# Patient Record
Sex: MALE | Race: WHITE | Employment: OTHER | URBAN - METROPOLITAN AREA
[De-identification: names, ages, dates, MRNs, and addresses within clinical notes are randomized per-mention and may not be internally consistent; named-entity substitution may affect disease eponyms.]

---

## 2017-04-05 ENCOUNTER — HOSPITAL ENCOUNTER (OUTPATIENT)
Dept: CT IMAGING | Age: 62
Discharge: HOME | End: 2017-04-05
Payer: COMMERCIAL

## 2017-04-05 DIAGNOSIS — K43.2 INCISIONAL HERNIA WITHOUT OBSTRUCTION OR GANGRENE: ICD-10-CM

## 2017-04-05 LAB
BUN BLDV-MCNC: 20 MG/DL (ref 8–23)
CREAT SERPL-MCNC: 0.76 MG/DL (ref 0.7–1.2)
GFR AFRICAN AMERICAN: >60 ML/MIN
GFR NON-AFRICAN AMERICAN: >60 ML/MIN
GFR SERPL CREATININE-BSD FRML MDRD: NORMAL ML/MIN/{1.73_M2}
GFR SERPL CREATININE-BSD FRML MDRD: NORMAL ML/MIN/{1.73_M2}

## 2017-04-05 PROCEDURE — 74177 CT ABD & PELVIS W/CONTRAST: CPT

## 2017-04-05 PROCEDURE — 84520 ASSAY OF UREA NITROGEN: CPT

## 2017-04-05 PROCEDURE — 2580000003 HC RX 258: Performed by: SURGERY

## 2017-04-05 PROCEDURE — 82565 ASSAY OF CREATININE: CPT

## 2017-04-05 PROCEDURE — 36415 COLL VENOUS BLD VENIPUNCTURE: CPT

## 2017-04-05 PROCEDURE — 6360000004 HC RX CONTRAST MEDICATION: Performed by: SURGERY

## 2017-04-05 RX ORDER — 0.9 % SODIUM CHLORIDE 0.9 %
100 INTRAVENOUS SOLUTION INTRAVENOUS ONCE
Status: COMPLETED | OUTPATIENT
Start: 2017-04-05 | End: 2017-04-05

## 2017-04-05 RX ORDER — SODIUM CHLORIDE 0.9 % (FLUSH) 0.9 %
10 SYRINGE (ML) INJECTION PRN
Status: DISCONTINUED | OUTPATIENT
Start: 2017-04-05 | End: 2017-04-11 | Stop reason: HOSPADM

## 2017-04-05 RX ADMIN — IOHEXOL 50 ML: 240 INJECTION, SOLUTION INTRATHECAL; INTRAVASCULAR; INTRAVENOUS; ORAL at 10:19

## 2017-04-05 RX ADMIN — SODIUM CHLORIDE 100 ML: 9 INJECTION, SOLUTION INTRAVENOUS at 10:19

## 2017-04-05 RX ADMIN — Medication 10 ML: at 10:19

## 2017-04-05 RX ADMIN — IOVERSOL 130 ML: 741 INJECTION INTRA-ARTERIAL; INTRAVENOUS at 10:18

## 2017-04-08 ENCOUNTER — HOSPITAL ENCOUNTER (OUTPATIENT)
Dept: PREADMISSION TESTING | Age: 62
Discharge: HOME | End: 2017-04-08
Payer: COMMERCIAL

## 2017-04-08 VITALS
SYSTOLIC BLOOD PRESSURE: 135 MMHG | DIASTOLIC BLOOD PRESSURE: 85 MMHG | HEIGHT: 71 IN | HEART RATE: 88 BPM | TEMPERATURE: 98.1 F | RESPIRATION RATE: 16 BRPM | BODY MASS INDEX: 28.56 KG/M2 | WEIGHT: 204 LBS | OXYGEN SATURATION: 95 %

## 2017-04-08 LAB
ABSOLUTE EOS #: 0.4 K/UL (ref 0–0.4)
ABSOLUTE LYMPH #: 2 K/UL (ref 1–4.8)
ABSOLUTE MONO #: 0.6 K/UL (ref 0.1–1.3)
ANION GAP SERPL CALCULATED.3IONS-SCNC: 14 MMOL/L (ref 9–17)
BASOPHILS # BLD: 1 % (ref 0–2)
BASOPHILS ABSOLUTE: 0.1 K/UL (ref 0–0.2)
BUN BLDV-MCNC: 19 MG/DL (ref 8–23)
BUN/CREAT BLD: ABNORMAL (ref 9–20)
CALCIUM SERPL-MCNC: 9.6 MG/DL (ref 8.6–10.4)
CHLORIDE BLD-SCNC: 102 MMOL/L (ref 98–107)
CO2: 22 MMOL/L (ref 20–31)
CREAT SERPL-MCNC: 0.66 MG/DL (ref 0.7–1.2)
DIFFERENTIAL TYPE: ABNORMAL
EOSINOPHILS RELATIVE PERCENT: 5 % (ref 0–4)
GFR AFRICAN AMERICAN: >60 ML/MIN
GFR NON-AFRICAN AMERICAN: >60 ML/MIN
GFR SERPL CREATININE-BSD FRML MDRD: ABNORMAL ML/MIN/{1.73_M2}
GFR SERPL CREATININE-BSD FRML MDRD: ABNORMAL ML/MIN/{1.73_M2}
GLUCOSE BLD-MCNC: 95 MG/DL (ref 70–99)
HCT VFR BLD CALC: 43.4 % (ref 41–53)
HEMOGLOBIN: 14.7 G/DL (ref 13.5–17.5)
LYMPHOCYTES # BLD: 26 % (ref 24–44)
MCH RBC QN AUTO: 32.3 PG (ref 26–34)
MCHC RBC AUTO-ENTMCNC: 33.8 G/DL (ref 31–37)
MCV RBC AUTO: 95.6 FL (ref 80–100)
MONOCYTES # BLD: 8 % (ref 1–7)
PDW BLD-RTO: 12.7 % (ref 11.5–14.9)
PLATELET # BLD: 206 K/UL (ref 150–450)
PLATELET ESTIMATE: ABNORMAL
PMV BLD AUTO: 9.9 FL (ref 6–12)
POTASSIUM SERPL-SCNC: 4.2 MMOL/L (ref 3.7–5.3)
RBC # BLD: 4.54 M/UL (ref 4.5–5.9)
RBC # BLD: ABNORMAL 10*6/UL
SEG NEUTROPHILS: 60 % (ref 36–66)
SEGMENTED NEUTROPHILS ABSOLUTE COUNT: 4.7 K/UL (ref 1.3–9.1)
SODIUM BLD-SCNC: 138 MMOL/L (ref 135–144)
WBC # BLD: 7.7 K/UL (ref 3.5–11)
WBC # BLD: ABNORMAL 10*3/UL

## 2017-04-08 PROCEDURE — 93005 ELECTROCARDIOGRAM TRACING: CPT

## 2017-04-08 PROCEDURE — 80048 BASIC METABOLIC PNL TOTAL CA: CPT

## 2017-04-08 PROCEDURE — 85025 COMPLETE CBC W/AUTO DIFF WBC: CPT

## 2017-04-08 PROCEDURE — 36415 COLL VENOUS BLD VENIPUNCTURE: CPT

## 2017-04-08 RX ORDER — M-VIT,TX,IRON,MINS/CALC/FOLIC 27MG-0.4MG
1 TABLET ORAL DAILY
COMMUNITY

## 2017-04-08 RX ORDER — ASPIRIN 325 MG
325 TABLET ORAL DAILY
COMMUNITY

## 2017-04-08 RX ORDER — LISINOPRIL AND HYDROCHLOROTHIAZIDE 12.5; 1 MG/1; MG/1
1 TABLET ORAL DAILY
COMMUNITY

## 2017-04-08 RX ORDER — NAPROXEN SODIUM 220 MG
220 TABLET ORAL PRN
Status: ON HOLD | COMMUNITY
End: 2020-08-25

## 2017-04-10 ENCOUNTER — ANESTHESIA EVENT (OUTPATIENT)
Dept: OPERATING ROOM | Age: 62
End: 2017-04-10
Payer: COMMERCIAL

## 2017-04-11 ENCOUNTER — ANESTHESIA (OUTPATIENT)
Dept: OPERATING ROOM | Age: 62
End: 2017-04-11
Payer: COMMERCIAL

## 2017-04-11 ENCOUNTER — HOSPITAL ENCOUNTER (OUTPATIENT)
Age: 62
Setting detail: OUTPATIENT SURGERY
Discharge: HOME | End: 2017-04-11
Attending: SURGERY | Admitting: SURGERY
Payer: COMMERCIAL

## 2017-04-11 VITALS
SYSTOLIC BLOOD PRESSURE: 133 MMHG | BODY MASS INDEX: 28.56 KG/M2 | RESPIRATION RATE: 16 BRPM | TEMPERATURE: 98.2 F | HEIGHT: 71 IN | OXYGEN SATURATION: 93 % | WEIGHT: 204 LBS | HEART RATE: 81 BPM | DIASTOLIC BLOOD PRESSURE: 74 MMHG

## 2017-04-11 VITALS — OXYGEN SATURATION: 99 % | DIASTOLIC BLOOD PRESSURE: 51 MMHG | TEMPERATURE: 95.9 F | SYSTOLIC BLOOD PRESSURE: 87 MMHG

## 2017-04-11 PROCEDURE — 88305 TISSUE EXAM BY PATHOLOGIST: CPT

## 2017-04-11 PROCEDURE — 3700000000 HC ANESTHESIA ATTENDED CARE: Performed by: SURGERY

## 2017-04-11 PROCEDURE — 3700000001 HC ADD 15 MINUTES (ANESTHESIA): Performed by: SURGERY

## 2017-04-11 PROCEDURE — 7100000031 HC ASPR PHASE II RECOVERY - ADDTL 15 MIN: Performed by: SURGERY

## 2017-04-11 PROCEDURE — 3609010400 HC COLONOSCOPY POLYPECTOMY HOT BIOPSY: Performed by: SURGERY

## 2017-04-11 PROCEDURE — 2580000003 HC RX 258: Performed by: ANESTHESIOLOGY

## 2017-04-11 PROCEDURE — 7100000000 HC PACU RECOVERY - FIRST 15 MIN: Performed by: SURGERY

## 2017-04-11 PROCEDURE — 7100000001 HC PACU RECOVERY - ADDTL 15 MIN: Performed by: SURGERY

## 2017-04-11 PROCEDURE — 2500000003 HC RX 250 WO HCPCS: Performed by: NURSE ANESTHETIST, CERTIFIED REGISTERED

## 2017-04-11 PROCEDURE — 6360000002 HC RX W HCPCS: Performed by: NURSE ANESTHETIST, CERTIFIED REGISTERED

## 2017-04-11 PROCEDURE — 7100000030 HC ASPR PHASE II RECOVERY - FIRST 15 MIN: Performed by: SURGERY

## 2017-04-11 RX ORDER — MORPHINE SULFATE 2 MG/ML
2 INJECTION, SOLUTION INTRAMUSCULAR; INTRAVENOUS EVERY 5 MIN PRN
Status: DISCONTINUED | OUTPATIENT
Start: 2017-04-11 | End: 2017-04-11 | Stop reason: HOSPADM

## 2017-04-11 RX ORDER — ONDANSETRON 2 MG/ML
INJECTION INTRAMUSCULAR; INTRAVENOUS PRN
Status: DISCONTINUED | OUTPATIENT
Start: 2017-04-11 | End: 2017-04-11 | Stop reason: SDUPTHER

## 2017-04-11 RX ORDER — SODIUM CHLORIDE 0.9 % (FLUSH) 0.9 %
10 SYRINGE (ML) INJECTION PRN
Status: DISCONTINUED | OUTPATIENT
Start: 2017-04-11 | End: 2017-04-11 | Stop reason: HOSPADM

## 2017-04-11 RX ORDER — SODIUM CHLORIDE 0.9 % (FLUSH) 0.9 %
10 SYRINGE (ML) INJECTION EVERY 12 HOURS SCHEDULED
Status: DISCONTINUED | OUTPATIENT
Start: 2017-04-11 | End: 2017-04-11 | Stop reason: HOSPADM

## 2017-04-11 RX ORDER — PROPOFOL 10 MG/ML
INJECTION, EMULSION INTRAVENOUS PRN
Status: DISCONTINUED | OUTPATIENT
Start: 2017-04-11 | End: 2017-04-11 | Stop reason: SDUPTHER

## 2017-04-11 RX ORDER — DIPHENHYDRAMINE HYDROCHLORIDE 50 MG/ML
12.5 INJECTION INTRAMUSCULAR; INTRAVENOUS
Status: DISCONTINUED | OUTPATIENT
Start: 2017-04-11 | End: 2017-04-11 | Stop reason: HOSPADM

## 2017-04-11 RX ORDER — LIDOCAINE HYDROCHLORIDE 10 MG/ML
INJECTION, SOLUTION EPIDURAL; INFILTRATION; INTRACAUDAL; PERINEURAL PRN
Status: DISCONTINUED | OUTPATIENT
Start: 2017-04-11 | End: 2017-04-11 | Stop reason: SDUPTHER

## 2017-04-11 RX ORDER — FENTANYL CITRATE 50 UG/ML
INJECTION, SOLUTION INTRAMUSCULAR; INTRAVENOUS PRN
Status: DISCONTINUED | OUTPATIENT
Start: 2017-04-11 | End: 2017-04-11 | Stop reason: SDUPTHER

## 2017-04-11 RX ORDER — MEPERIDINE HYDROCHLORIDE 25 MG/ML
12.5 INJECTION INTRAMUSCULAR; INTRAVENOUS; SUBCUTANEOUS EVERY 5 MIN PRN
Status: DISCONTINUED | OUTPATIENT
Start: 2017-04-11 | End: 2017-04-11 | Stop reason: HOSPADM

## 2017-04-11 RX ORDER — LABETALOL HYDROCHLORIDE 5 MG/ML
5 INJECTION, SOLUTION INTRAVENOUS EVERY 10 MIN PRN
Status: DISCONTINUED | OUTPATIENT
Start: 2017-04-11 | End: 2017-04-11 | Stop reason: HOSPADM

## 2017-04-11 RX ORDER — ONDANSETRON 2 MG/ML
4 INJECTION INTRAMUSCULAR; INTRAVENOUS
Status: DISCONTINUED | OUTPATIENT
Start: 2017-04-11 | End: 2017-04-11 | Stop reason: HOSPADM

## 2017-04-11 RX ORDER — MIDAZOLAM HYDROCHLORIDE 1 MG/ML
INJECTION INTRAMUSCULAR; INTRAVENOUS PRN
Status: DISCONTINUED | OUTPATIENT
Start: 2017-04-11 | End: 2017-04-11 | Stop reason: SDUPTHER

## 2017-04-11 RX ORDER — EPHEDRINE SULFATE 50 MG/ML
INJECTION, SOLUTION INTRAVENOUS PRN
Status: DISCONTINUED | OUTPATIENT
Start: 2017-04-11 | End: 2017-04-11 | Stop reason: SDUPTHER

## 2017-04-11 RX ORDER — SODIUM CHLORIDE, SODIUM LACTATE, POTASSIUM CHLORIDE, CALCIUM CHLORIDE 600; 310; 30; 20 MG/100ML; MG/100ML; MG/100ML; MG/100ML
INJECTION, SOLUTION INTRAVENOUS CONTINUOUS
Status: DISCONTINUED | OUTPATIENT
Start: 2017-04-11 | End: 2017-04-11 | Stop reason: HOSPADM

## 2017-04-11 RX ADMIN — ONDANSETRON 4 MG: 2 INJECTION, SOLUTION INTRAMUSCULAR; INTRAVENOUS at 12:16

## 2017-04-11 RX ADMIN — MIDAZOLAM HYDROCHLORIDE 2 MG: 1 INJECTION, SOLUTION INTRAMUSCULAR; INTRAVENOUS at 11:48

## 2017-04-11 RX ADMIN — LIDOCAINE HYDROCHLORIDE 50 MG: 10 INJECTION, SOLUTION EPIDURAL; INFILTRATION; INTRACAUDAL; PERINEURAL at 11:52

## 2017-04-11 RX ADMIN — SODIUM CHLORIDE, POTASSIUM CHLORIDE, SODIUM LACTATE AND CALCIUM CHLORIDE: 600; 310; 30; 20 INJECTION, SOLUTION INTRAVENOUS at 10:51

## 2017-04-11 RX ADMIN — EPHEDRINE SULFATE 10 MG: 50 INJECTION INTRAMUSCULAR; INTRAVENOUS; SUBCUTANEOUS at 12:14

## 2017-04-11 RX ADMIN — EPHEDRINE SULFATE 10 MG: 50 INJECTION INTRAMUSCULAR; INTRAVENOUS; SUBCUTANEOUS at 12:35

## 2017-04-11 RX ADMIN — PROPOFOL 200 MG: 10 INJECTION, EMULSION INTRAVENOUS at 11:52

## 2017-04-11 RX ADMIN — FENTANYL CITRATE 100 MCG: 50 INJECTION, SOLUTION INTRAMUSCULAR; INTRAVENOUS at 12:02

## 2017-04-11 RX ADMIN — SODIUM CHLORIDE, POTASSIUM CHLORIDE, SODIUM LACTATE AND CALCIUM CHLORIDE: 600; 310; 30; 20 INJECTION, SOLUTION INTRAVENOUS at 11:48

## 2017-04-11 ASSESSMENT — ENCOUNTER SYMPTOMS
STRIDOR: 0
SHORTNESS OF BREATH: 0

## 2017-04-11 ASSESSMENT — PAIN - FUNCTIONAL ASSESSMENT: PAIN_FUNCTIONAL_ASSESSMENT: 0-10

## 2017-04-11 ASSESSMENT — PAIN SCALES - GENERAL
PAINLEVEL_OUTOF10: 0

## 2017-04-12 LAB
EKG ATRIAL RATE: 80 BPM
EKG P AXIS: 67 DEGREES
EKG P-R INTERVAL: 172 MS
EKG Q-T INTERVAL: 382 MS
EKG QRS DURATION: 80 MS
EKG QTC CALCULATION (BAZETT): 438 MS
EKG R AXIS: 55 DEGREES
EKG T AXIS: 50 DEGREES
EKG VENTRICULAR RATE: 79 BPM
SURGICAL PATHOLOGY REPORT: NORMAL

## 2017-04-25 ENCOUNTER — HOSPITAL ENCOUNTER (OUTPATIENT)
Dept: NUCLEAR MEDICINE | Age: 62
Discharge: HOME | End: 2017-04-25
Payer: COMMERCIAL

## 2017-04-25 ENCOUNTER — HOSPITAL ENCOUNTER (OUTPATIENT)
Dept: NON INVASIVE DIAGNOSTICS | Age: 62
Discharge: HOME | End: 2017-04-25
Payer: COMMERCIAL

## 2017-04-25 DIAGNOSIS — Z01.818 ENCOUNTER FOR PREOPERATIVE ANESTHESIOLOGY ASSESSMENT FOR CARDIAC SURGERY: ICD-10-CM

## 2017-04-25 LAB
LV EF: 58 %
LVEF MODALITY: NORMAL

## 2017-04-25 PROCEDURE — 2580000003 HC RX 258: Performed by: INTERNAL MEDICINE

## 2017-04-25 PROCEDURE — A9500 TC99M SESTAMIBI: HCPCS | Performed by: INTERNAL MEDICINE

## 2017-04-25 PROCEDURE — 78452 HT MUSCLE IMAGE SPECT MULT: CPT

## 2017-04-25 PROCEDURE — 3430000000 HC RX DIAGNOSTIC RADIOPHARMACEUTICAL: Performed by: INTERNAL MEDICINE

## 2017-04-25 PROCEDURE — 93017 CV STRESS TEST TRACING ONLY: CPT

## 2017-04-25 RX ORDER — SODIUM CHLORIDE 0.9 % (FLUSH) 0.9 %
10 SYRINGE (ML) INJECTION PRN
Status: ACTIVE | OUTPATIENT
Start: 2017-04-25 | End: 2017-04-26

## 2017-04-25 RX ORDER — METOPROLOL TARTRATE 5 MG/5ML
2.5 INJECTION INTRAVENOUS PRN
Status: ACTIVE | OUTPATIENT
Start: 2017-04-25 | End: 2017-04-26

## 2017-04-25 RX ORDER — METOPROLOL TARTRATE 5 MG/5ML
2.5 INJECTION INTRAVENOUS PRN
Status: CANCELLED | OUTPATIENT
Start: 2017-04-25 | End: 2017-04-26

## 2017-04-25 RX ORDER — AMINOPHYLLINE DIHYDRATE 25 MG/ML
100 INJECTION, SOLUTION INTRAVENOUS
Status: CANCELLED | OUTPATIENT
Start: 2017-04-25 | End: 2017-04-25

## 2017-04-25 RX ORDER — 0.9 % SODIUM CHLORIDE 0.9 %
250 INTRAVENOUS SOLUTION INTRAVENOUS ONCE
Status: DISCONTINUED | OUTPATIENT
Start: 2017-04-25 | End: 2017-04-28 | Stop reason: HOSPADM

## 2017-04-25 RX ORDER — NITROGLYCERIN 0.4 MG/1
0.4 TABLET SUBLINGUAL EVERY 5 MIN PRN
Status: CANCELLED | OUTPATIENT
Start: 2017-04-25 | End: 2017-04-26

## 2017-04-25 RX ORDER — 0.9 % SODIUM CHLORIDE 0.9 %
250 INTRAVENOUS SOLUTION INTRAVENOUS ONCE
Status: CANCELLED | OUTPATIENT
Start: 2017-04-25 | End: 2017-04-25

## 2017-04-25 RX ORDER — NITROGLYCERIN 0.4 MG/1
0.4 TABLET SUBLINGUAL EVERY 5 MIN PRN
Status: ACTIVE | OUTPATIENT
Start: 2017-04-25 | End: 2017-04-26

## 2017-04-25 RX ADMIN — TETRAKIS(2-METHOXYISOBUTYLISOCYANIDE)COPPER(I) TETRAFLUOROBORATE 11.12 MILLICURIE: 1 INJECTION, POWDER, LYOPHILIZED, FOR SOLUTION INTRAVENOUS at 07:26

## 2017-04-25 RX ADMIN — TETRAKIS(2-METHOXYISOBUTYLISOCYANIDE)COPPER(I) TETRAFLUOROBORATE 36.2 MILLICURIE: 1 INJECTION, POWDER, LYOPHILIZED, FOR SOLUTION INTRAVENOUS at 08:50

## 2017-04-25 RX ADMIN — Medication 10 ML: at 08:27

## 2017-05-15 ENCOUNTER — HOSPITAL ENCOUNTER (OUTPATIENT)
Dept: PREADMISSION TESTING | Age: 62
Discharge: HOME | End: 2017-05-15
Payer: COMMERCIAL

## 2017-05-15 VITALS
DIASTOLIC BLOOD PRESSURE: 89 MMHG | HEIGHT: 72 IN | TEMPERATURE: 97.5 F | OXYGEN SATURATION: 98 % | HEART RATE: 90 BPM | WEIGHT: 197 LBS | BODY MASS INDEX: 26.68 KG/M2 | RESPIRATION RATE: 16 BRPM | SYSTOLIC BLOOD PRESSURE: 131 MMHG

## 2017-05-15 LAB
ABSOLUTE EOS #: 0.4 K/UL (ref 0–0.4)
ABSOLUTE LYMPH #: 1.6 K/UL (ref 1–4.8)
ABSOLUTE MONO #: 0.7 K/UL (ref 0.1–1.3)
ANION GAP SERPL CALCULATED.3IONS-SCNC: 13 MMOL/L (ref 9–17)
BASOPHILS # BLD: 1 %
BASOPHILS ABSOLUTE: 0.1 K/UL (ref 0–0.2)
BUN BLDV-MCNC: 15 MG/DL (ref 8–23)
BUN/CREAT BLD: ABNORMAL (ref 9–20)
CALCIUM SERPL-MCNC: 9.5 MG/DL (ref 8.6–10.4)
CHLORIDE BLD-SCNC: 101 MMOL/L (ref 98–107)
CO2: 25 MMOL/L (ref 20–31)
CREAT SERPL-MCNC: 0.77 MG/DL (ref 0.7–1.2)
DIFFERENTIAL TYPE: NORMAL
EOSINOPHILS RELATIVE PERCENT: 5 %
GFR AFRICAN AMERICAN: >60 ML/MIN
GFR NON-AFRICAN AMERICAN: >60 ML/MIN
GFR SERPL CREATININE-BSD FRML MDRD: ABNORMAL ML/MIN/{1.73_M2}
GFR SERPL CREATININE-BSD FRML MDRD: ABNORMAL ML/MIN/{1.73_M2}
GLUCOSE BLD-MCNC: 125 MG/DL (ref 70–99)
HCT VFR BLD CALC: 44.2 % (ref 41–53)
HEMOGLOBIN: 15 G/DL (ref 13.5–17.5)
LYMPHOCYTES # BLD: 19 %
MCH RBC QN AUTO: 32.8 PG (ref 26–34)
MCHC RBC AUTO-ENTMCNC: 33.9 G/DL (ref 31–37)
MCV RBC AUTO: 96.8 FL (ref 80–100)
MONOCYTES # BLD: 8 %
PDW BLD-RTO: 13.7 % (ref 11.5–14.9)
PLATELET # BLD: 251 K/UL (ref 150–450)
PLATELET ESTIMATE: NORMAL
PMV BLD AUTO: 8.6 FL (ref 6–12)
POTASSIUM SERPL-SCNC: 4.9 MMOL/L (ref 3.7–5.3)
RBC # BLD: 4.56 M/UL (ref 4.5–5.9)
RBC # BLD: NORMAL 10*6/UL
SEG NEUTROPHILS: 67 %
SEGMENTED NEUTROPHILS ABSOLUTE COUNT: 5.6 K/UL (ref 1.3–9.1)
SODIUM BLD-SCNC: 139 MMOL/L (ref 135–144)
WBC # BLD: 8.4 K/UL (ref 3.5–11)
WBC # BLD: NORMAL 10*3/UL

## 2017-05-15 PROCEDURE — 36415 COLL VENOUS BLD VENIPUNCTURE: CPT

## 2017-05-15 PROCEDURE — 80048 BASIC METABOLIC PNL TOTAL CA: CPT

## 2017-05-15 PROCEDURE — 85025 COMPLETE CBC W/AUTO DIFF WBC: CPT

## 2017-05-16 ENCOUNTER — ANESTHESIA EVENT (OUTPATIENT)
Dept: OPERATING ROOM | Age: 62
End: 2017-05-16
Payer: COMMERCIAL

## 2017-05-16 RX ORDER — LIDOCAINE HYDROCHLORIDE 10 MG/ML
1 INJECTION, SOLUTION EPIDURAL; INFILTRATION; INTRACAUDAL; PERINEURAL
Status: CANCELLED | OUTPATIENT
Start: 2017-05-16 | End: 2017-05-16

## 2017-05-16 RX ORDER — SODIUM CHLORIDE, SODIUM LACTATE, POTASSIUM CHLORIDE, CALCIUM CHLORIDE 600; 310; 30; 20 MG/100ML; MG/100ML; MG/100ML; MG/100ML
INJECTION, SOLUTION INTRAVENOUS CONTINUOUS
Status: CANCELLED | OUTPATIENT
Start: 2017-05-16

## 2017-05-16 RX ORDER — SODIUM CHLORIDE 0.9 % (FLUSH) 0.9 %
10 SYRINGE (ML) INJECTION EVERY 12 HOURS SCHEDULED
Status: CANCELLED | OUTPATIENT
Start: 2017-05-16

## 2017-05-16 RX ORDER — SODIUM CHLORIDE 0.9 % (FLUSH) 0.9 %
10 SYRINGE (ML) INJECTION PRN
Status: CANCELLED | OUTPATIENT
Start: 2017-05-16

## 2017-05-23 ENCOUNTER — HOSPITAL ENCOUNTER (OUTPATIENT)
Age: 62
Setting detail: OUTPATIENT SURGERY
Discharge: HOME | End: 2017-05-23
Attending: SURGERY | Admitting: SURGERY
Payer: COMMERCIAL

## 2017-05-23 ENCOUNTER — ANESTHESIA (OUTPATIENT)
Dept: OPERATING ROOM | Age: 62
End: 2017-05-23
Payer: COMMERCIAL

## 2017-05-23 VITALS
BODY MASS INDEX: 26.68 KG/M2 | DIASTOLIC BLOOD PRESSURE: 79 MMHG | HEIGHT: 72 IN | OXYGEN SATURATION: 97 % | HEART RATE: 83 BPM | RESPIRATION RATE: 16 BRPM | SYSTOLIC BLOOD PRESSURE: 145 MMHG | TEMPERATURE: 96.6 F | WEIGHT: 197 LBS

## 2017-05-23 VITALS — TEMPERATURE: 95.4 F | SYSTOLIC BLOOD PRESSURE: 148 MMHG | DIASTOLIC BLOOD PRESSURE: 92 MMHG | OXYGEN SATURATION: 99 %

## 2017-05-23 PROCEDURE — 7100000001 HC PACU RECOVERY - ADDTL 15 MIN: Performed by: SURGERY

## 2017-05-23 PROCEDURE — 2580000003 HC RX 258: Performed by: ANESTHESIOLOGY

## 2017-05-23 PROCEDURE — 6360000002 HC RX W HCPCS: Performed by: SURGERY

## 2017-05-23 PROCEDURE — 3700000001 HC ADD 15 MINUTES (ANESTHESIA): Performed by: SURGERY

## 2017-05-23 PROCEDURE — 6360000002 HC RX W HCPCS: Performed by: ANESTHESIOLOGY

## 2017-05-23 PROCEDURE — 2500000003 HC RX 250 WO HCPCS: Performed by: NURSE ANESTHETIST, CERTIFIED REGISTERED

## 2017-05-23 PROCEDURE — C1781 MESH (IMPLANTABLE): HCPCS | Performed by: SURGERY

## 2017-05-23 PROCEDURE — 7100000000 HC PACU RECOVERY - FIRST 15 MIN: Performed by: SURGERY

## 2017-05-23 PROCEDURE — 7100000031 HC ASPR PHASE II RECOVERY - ADDTL 15 MIN: Performed by: SURGERY

## 2017-05-23 PROCEDURE — 3700000000 HC ANESTHESIA ATTENDED CARE: Performed by: SURGERY

## 2017-05-23 PROCEDURE — 7100000030 HC ASPR PHASE II RECOVERY - FIRST 15 MIN: Performed by: SURGERY

## 2017-05-23 PROCEDURE — 3600000002 HC SURGERY LEVEL 2 BASE: Performed by: SURGERY

## 2017-05-23 PROCEDURE — 6360000002 HC RX W HCPCS: Performed by: NURSE ANESTHETIST, CERTIFIED REGISTERED

## 2017-05-23 PROCEDURE — 3600000012 HC SURGERY LEVEL 2 ADDTL 15MIN: Performed by: SURGERY

## 2017-05-23 PROCEDURE — 88302 TISSUE EXAM BY PATHOLOGIST: CPT

## 2017-05-23 DEVICE — IMPLANTABLE DEVICE: Type: IMPLANTABLE DEVICE | Site: ABDOMEN | Status: FUNCTIONAL

## 2017-05-23 RX ORDER — LIDOCAINE HYDROCHLORIDE 10 MG/ML
1 INJECTION, SOLUTION EPIDURAL; INFILTRATION; INTRACAUDAL; PERINEURAL
Status: DISCONTINUED | OUTPATIENT
Start: 2017-05-23 | End: 2017-05-23 | Stop reason: HOSPADM

## 2017-05-23 RX ORDER — MEPERIDINE HYDROCHLORIDE 25 MG/ML
12.5 INJECTION INTRAMUSCULAR; INTRAVENOUS; SUBCUTANEOUS EVERY 5 MIN PRN
Status: DISCONTINUED | OUTPATIENT
Start: 2017-05-23 | End: 2017-05-23 | Stop reason: HOSPADM

## 2017-05-23 RX ORDER — OXYCODONE HYDROCHLORIDE AND ACETAMINOPHEN 5; 325 MG/1; MG/1
TABLET ORAL
Qty: 30 TABLET | Refills: 0 | Status: ON HOLD | OUTPATIENT
Start: 2017-05-23 | End: 2020-08-25

## 2017-05-23 RX ORDER — DIPHENHYDRAMINE HYDROCHLORIDE 50 MG/ML
12.5 INJECTION INTRAMUSCULAR; INTRAVENOUS
Status: DISCONTINUED | OUTPATIENT
Start: 2017-05-23 | End: 2017-05-23 | Stop reason: HOSPADM

## 2017-05-23 RX ORDER — ONDANSETRON 2 MG/ML
INJECTION INTRAMUSCULAR; INTRAVENOUS PRN
Status: DISCONTINUED | OUTPATIENT
Start: 2017-05-23 | End: 2017-05-23 | Stop reason: SDUPTHER

## 2017-05-23 RX ORDER — PROPOFOL 10 MG/ML
INJECTION, EMULSION INTRAVENOUS PRN
Status: DISCONTINUED | OUTPATIENT
Start: 2017-05-23 | End: 2017-05-23 | Stop reason: SDUPTHER

## 2017-05-23 RX ORDER — SODIUM CHLORIDE, SODIUM LACTATE, POTASSIUM CHLORIDE, CALCIUM CHLORIDE 600; 310; 30; 20 MG/100ML; MG/100ML; MG/100ML; MG/100ML
INJECTION, SOLUTION INTRAVENOUS CONTINUOUS
Status: DISCONTINUED | OUTPATIENT
Start: 2017-05-23 | End: 2017-05-23 | Stop reason: HOSPADM

## 2017-05-23 RX ORDER — MORPHINE SULFATE 2 MG/ML
2 INJECTION, SOLUTION INTRAMUSCULAR; INTRAVENOUS EVERY 5 MIN PRN
Status: DISCONTINUED | OUTPATIENT
Start: 2017-05-23 | End: 2017-05-23 | Stop reason: HOSPADM

## 2017-05-23 RX ORDER — OXYCODONE HYDROCHLORIDE AND ACETAMINOPHEN 5; 325 MG/1; MG/1
2 TABLET ORAL EVERY 4 HOURS PRN
Status: DISCONTINUED | OUTPATIENT
Start: 2017-05-23 | End: 2017-05-23 | Stop reason: HOSPADM

## 2017-05-23 RX ORDER — DEXAMETHASONE SODIUM PHOSPHATE 4 MG/ML
INJECTION, SOLUTION INTRA-ARTICULAR; INTRALESIONAL; INTRAMUSCULAR; INTRAVENOUS; SOFT TISSUE PRN
Status: DISCONTINUED | OUTPATIENT
Start: 2017-05-23 | End: 2017-05-23 | Stop reason: SDUPTHER

## 2017-05-23 RX ORDER — CEPHALEXIN 500 MG/1
CAPSULE ORAL
Qty: 21 CAPSULE | Refills: 0 | Status: ON HOLD | OUTPATIENT
Start: 2017-05-23 | End: 2020-08-25

## 2017-05-23 RX ORDER — ROCURONIUM BROMIDE 10 MG/ML
INJECTION, SOLUTION INTRAVENOUS PRN
Status: DISCONTINUED | OUTPATIENT
Start: 2017-05-23 | End: 2017-05-23 | Stop reason: SDUPTHER

## 2017-05-23 RX ORDER — LIDOCAINE HYDROCHLORIDE 20 MG/ML
INJECTION, SOLUTION EPIDURAL; INFILTRATION; INTRACAUDAL; PERINEURAL PRN
Status: DISCONTINUED | OUTPATIENT
Start: 2017-05-23 | End: 2017-05-23 | Stop reason: SDUPTHER

## 2017-05-23 RX ORDER — MIDAZOLAM HYDROCHLORIDE 1 MG/ML
INJECTION INTRAMUSCULAR; INTRAVENOUS PRN
Status: DISCONTINUED | OUTPATIENT
Start: 2017-05-23 | End: 2017-05-23 | Stop reason: SDUPTHER

## 2017-05-23 RX ORDER — LABETALOL HYDROCHLORIDE 5 MG/ML
5 INJECTION, SOLUTION INTRAVENOUS EVERY 10 MIN PRN
Status: DISCONTINUED | OUTPATIENT
Start: 2017-05-23 | End: 2017-05-23 | Stop reason: HOSPADM

## 2017-05-23 RX ORDER — SODIUM CHLORIDE 0.9 % (FLUSH) 0.9 %
10 SYRINGE (ML) INJECTION EVERY 12 HOURS SCHEDULED
Status: DISCONTINUED | OUTPATIENT
Start: 2017-05-23 | End: 2017-05-23 | Stop reason: HOSPADM

## 2017-05-23 RX ORDER — ONDANSETRON 4 MG/1
TABLET, FILM COATED ORAL
Qty: 20 TABLET | Refills: 0 | Status: ON HOLD | OUTPATIENT
Start: 2017-05-23 | End: 2020-08-25

## 2017-05-23 RX ORDER — EPHEDRINE SULFATE 50 MG/ML
INJECTION, SOLUTION INTRAVENOUS PRN
Status: DISCONTINUED | OUTPATIENT
Start: 2017-05-23 | End: 2017-05-23 | Stop reason: SDUPTHER

## 2017-05-23 RX ORDER — FENTANYL CITRATE 50 UG/ML
INJECTION, SOLUTION INTRAMUSCULAR; INTRAVENOUS PRN
Status: DISCONTINUED | OUTPATIENT
Start: 2017-05-23 | End: 2017-05-23 | Stop reason: SDUPTHER

## 2017-05-23 RX ORDER — ONDANSETRON 2 MG/ML
4 INJECTION INTRAMUSCULAR; INTRAVENOUS
Status: DISCONTINUED | OUTPATIENT
Start: 2017-05-23 | End: 2017-05-23 | Stop reason: HOSPADM

## 2017-05-23 RX ORDER — SODIUM CHLORIDE 0.9 % (FLUSH) 0.9 %
10 SYRINGE (ML) INJECTION PRN
Status: DISCONTINUED | OUTPATIENT
Start: 2017-05-23 | End: 2017-05-23 | Stop reason: HOSPADM

## 2017-05-23 RX ADMIN — Medication 2 G: at 09:53

## 2017-05-23 RX ADMIN — EPHEDRINE SULFATE 10 MG: 50 INJECTION INTRAMUSCULAR; INTRAVENOUS; SUBCUTANEOUS at 10:22

## 2017-05-23 RX ADMIN — FENTANYL CITRATE 50 MCG: 50 INJECTION, SOLUTION INTRAMUSCULAR; INTRAVENOUS at 10:10

## 2017-05-23 RX ADMIN — FENTANYL CITRATE 150 MCG: 50 INJECTION, SOLUTION INTRAMUSCULAR; INTRAVENOUS at 09:46

## 2017-05-23 RX ADMIN — LIDOCAINE HYDROCHLORIDE 100 MG: 20 INJECTION, SOLUTION EPIDURAL; INFILTRATION; INTRACAUDAL; PERINEURAL at 09:46

## 2017-05-23 RX ADMIN — EPHEDRINE SULFATE 10 MG: 50 INJECTION INTRAMUSCULAR; INTRAVENOUS; SUBCUTANEOUS at 10:30

## 2017-05-23 RX ADMIN — HYDROMORPHONE HYDROCHLORIDE 0.5 MG: 1 INJECTION, SOLUTION INTRAMUSCULAR; INTRAVENOUS; SUBCUTANEOUS at 11:20

## 2017-05-23 RX ADMIN — EPHEDRINE SULFATE 10 MG: 50 INJECTION INTRAMUSCULAR; INTRAVENOUS; SUBCUTANEOUS at 10:17

## 2017-05-23 RX ADMIN — SODIUM CHLORIDE, POTASSIUM CHLORIDE, SODIUM LACTATE AND CALCIUM CHLORIDE: 600; 310; 30; 20 INJECTION, SOLUTION INTRAVENOUS at 09:33

## 2017-05-23 RX ADMIN — DEXAMETHASONE SODIUM PHOSPHATE 10 MG: 4 INJECTION, SOLUTION INTRAMUSCULAR; INTRAVENOUS at 10:09

## 2017-05-23 RX ADMIN — SODIUM CHLORIDE, POTASSIUM CHLORIDE, SODIUM LACTATE AND CALCIUM CHLORIDE: 600; 310; 30; 20 INJECTION, SOLUTION INTRAVENOUS at 10:20

## 2017-05-23 RX ADMIN — ROCURONIUM BROMIDE 10 MG: 10 INJECTION INTRAVENOUS at 10:12

## 2017-05-23 RX ADMIN — SUGAMMADEX 179 MG: 100 INJECTION, SOLUTION INTRAVENOUS at 10:41

## 2017-05-23 RX ADMIN — EPHEDRINE SULFATE 10 MG: 50 INJECTION INTRAMUSCULAR; INTRAVENOUS; SUBCUTANEOUS at 10:15

## 2017-05-23 RX ADMIN — HYDROMORPHONE HYDROCHLORIDE 0.25 MG: 1 INJECTION, SOLUTION INTRAMUSCULAR; INTRAVENOUS; SUBCUTANEOUS at 11:35

## 2017-05-23 RX ADMIN — ONDANSETRON 4 MG: 2 INJECTION INTRAMUSCULAR; INTRAVENOUS at 10:28

## 2017-05-23 RX ADMIN — FENTANYL CITRATE 50 MCG: 50 INJECTION, SOLUTION INTRAMUSCULAR; INTRAVENOUS at 10:12

## 2017-05-23 RX ADMIN — ROCURONIUM BROMIDE 50 MG: 10 INJECTION INTRAVENOUS at 09:48

## 2017-05-23 RX ADMIN — MIDAZOLAM 2 MG: 1 INJECTION INTRAMUSCULAR; INTRAVENOUS at 09:41

## 2017-05-23 RX ADMIN — PROPOFOL 200 MG: 10 INJECTION, EMULSION INTRAVENOUS at 09:46

## 2017-05-23 RX ADMIN — SODIUM CHLORIDE, POTASSIUM CHLORIDE, SODIUM LACTATE AND CALCIUM CHLORIDE: 600; 310; 30; 20 INJECTION, SOLUTION INTRAVENOUS at 08:43

## 2017-05-23 ASSESSMENT — ENCOUNTER SYMPTOMS
SHORTNESS OF BREATH: 0
STRIDOR: 0

## 2017-05-23 ASSESSMENT — PAIN SCALES - GENERAL
PAINLEVEL_OUTOF10: 5
PAINLEVEL_OUTOF10: 4
PAINLEVEL_OUTOF10: 4
PAINLEVEL_OUTOF10: 0
PAINLEVEL_OUTOF10: 5
PAINLEVEL_OUTOF10: 6

## 2017-05-23 ASSESSMENT — PAIN DESCRIPTION - LOCATION
LOCATION: ABDOMEN
LOCATION: ABDOMEN

## 2017-05-23 ASSESSMENT — PAIN - FUNCTIONAL ASSESSMENT: PAIN_FUNCTIONAL_ASSESSMENT: 0-10

## 2017-05-23 ASSESSMENT — PAIN DESCRIPTION - DESCRIPTORS
DESCRIPTORS: TIGHTNESS
DESCRIPTORS: TIGHTNESS

## 2017-05-23 ASSESSMENT — PAIN DESCRIPTION - PAIN TYPE: TYPE: SURGICAL PAIN

## 2017-05-23 ASSESSMENT — PAIN DESCRIPTION - ORIENTATION: ORIENTATION: MID

## 2017-05-24 LAB — SURGICAL PATHOLOGY REPORT: NORMAL

## 2017-07-25 ENCOUNTER — HOSPITAL ENCOUNTER (OUTPATIENT)
Age: 62
Setting detail: OUTPATIENT SURGERY
Discharge: HOME | End: 2017-07-25
Attending: SURGERY | Admitting: SURGERY
Payer: COMMERCIAL

## 2017-07-25 VITALS
WEIGHT: 205 LBS | HEIGHT: 72 IN | HEART RATE: 80 BPM | SYSTOLIC BLOOD PRESSURE: 153 MMHG | BODY MASS INDEX: 27.77 KG/M2 | TEMPERATURE: 97.5 F | OXYGEN SATURATION: 95 % | DIASTOLIC BLOOD PRESSURE: 89 MMHG | RESPIRATION RATE: 16 BRPM

## 2017-07-25 PROCEDURE — 88305 TISSUE EXAM BY PATHOLOGIST: CPT

## 2017-07-25 PROCEDURE — 6360000002 HC RX W HCPCS: Performed by: SURGERY

## 2017-07-25 PROCEDURE — 7100000010 HC PHASE II RECOVERY - FIRST 15 MIN: Performed by: SURGERY

## 2017-07-25 PROCEDURE — 6370000000 HC RX 637 (ALT 250 FOR IP): Performed by: SURGERY

## 2017-07-25 PROCEDURE — 3609017100 HC EGD: Performed by: SURGERY

## 2017-07-25 PROCEDURE — 99152 MOD SED SAME PHYS/QHP 5/>YRS: CPT | Performed by: SURGERY

## 2017-07-25 PROCEDURE — 2580000003 HC RX 258: Performed by: SURGERY

## 2017-07-25 PROCEDURE — 7100000011 HC PHASE II RECOVERY - ADDTL 15 MIN: Performed by: SURGERY

## 2017-07-25 RX ORDER — MIDAZOLAM HYDROCHLORIDE 1 MG/ML
INJECTION INTRAMUSCULAR; INTRAVENOUS PRN
Status: DISCONTINUED | OUTPATIENT
Start: 2017-07-25 | End: 2017-07-25 | Stop reason: HOSPADM

## 2017-07-25 RX ORDER — FENTANYL CITRATE 50 UG/ML
INJECTION, SOLUTION INTRAMUSCULAR; INTRAVENOUS PRN
Status: DISCONTINUED | OUTPATIENT
Start: 2017-07-25 | End: 2017-07-25 | Stop reason: HOSPADM

## 2017-07-25 RX ORDER — SODIUM CHLORIDE, SODIUM LACTATE, POTASSIUM CHLORIDE, CALCIUM CHLORIDE 600; 310; 30; 20 MG/100ML; MG/100ML; MG/100ML; MG/100ML
INJECTION, SOLUTION INTRAVENOUS CONTINUOUS
Status: DISCONTINUED | OUTPATIENT
Start: 2017-07-25 | End: 2017-07-25 | Stop reason: HOSPADM

## 2017-07-25 RX ADMIN — LIDOCAINE HYDROCHLORIDE 15 ML: 20 SOLUTION ORAL; TOPICAL at 12:43

## 2017-07-25 RX ADMIN — SODIUM CHLORIDE, POTASSIUM CHLORIDE, SODIUM LACTATE AND CALCIUM CHLORIDE: 600; 310; 30; 20 INJECTION, SOLUTION INTRAVENOUS at 12:43

## 2017-07-25 ASSESSMENT — PAIN SCALES - GENERAL
PAINLEVEL_OUTOF10: 0
PAINLEVEL_OUTOF10: 0

## 2017-07-25 ASSESSMENT — PAIN - FUNCTIONAL ASSESSMENT: PAIN_FUNCTIONAL_ASSESSMENT: 0-10

## 2017-07-26 LAB — SURGICAL PATHOLOGY REPORT: NORMAL

## 2020-08-21 ENCOUNTER — HOSPITAL ENCOUNTER (OUTPATIENT)
Dept: PREADMISSION TESTING | Age: 65
Setting detail: SPECIMEN
Discharge: HOME | End: 2020-08-25
Payer: MEDICARE

## 2020-08-21 ENCOUNTER — HOSPITAL ENCOUNTER (OUTPATIENT)
Age: 65
Setting detail: SPECIMEN
Discharge: HOME | End: 2020-08-25
Payer: MEDICARE

## 2020-08-25 ENCOUNTER — HOSPITAL ENCOUNTER (OUTPATIENT)
Age: 65
Setting detail: OUTPATIENT SURGERY
Discharge: HOME | End: 2020-08-25
Attending: SURGERY | Admitting: SURGERY
Payer: MEDICARE

## 2020-08-25 ENCOUNTER — ANESTHESIA (OUTPATIENT)
Dept: ENDOSCOPY | Age: 65
End: 2020-08-25
Payer: MEDICARE

## 2020-08-25 ENCOUNTER — ANESTHESIA EVENT (OUTPATIENT)
Dept: ENDOSCOPY | Age: 65
End: 2020-08-25
Payer: MEDICARE

## 2020-08-25 VITALS
HEART RATE: 89 BPM | SYSTOLIC BLOOD PRESSURE: 148 MMHG | DIASTOLIC BLOOD PRESSURE: 85 MMHG | HEIGHT: 72 IN | BODY MASS INDEX: 27.77 KG/M2 | WEIGHT: 205 LBS | TEMPERATURE: 96.6 F | OXYGEN SATURATION: 95 % | RESPIRATION RATE: 14 BRPM

## 2020-08-25 VITALS — SYSTOLIC BLOOD PRESSURE: 131 MMHG | DIASTOLIC BLOOD PRESSURE: 80 MMHG | OXYGEN SATURATION: 100 % | TEMPERATURE: 95.4 F

## 2020-08-25 LAB
SARS-COV-2, PCR: NORMAL
SARS-COV-2, RAPID: NOT DETECTED
SARS-COV-2: NORMAL
SOURCE: NORMAL

## 2020-08-25 PROCEDURE — 2720000010 HC SURG SUPPLY STERILE: Performed by: SURGERY

## 2020-08-25 PROCEDURE — 88305 TISSUE EXAM BY PATHOLOGIST: CPT

## 2020-08-25 PROCEDURE — 7100000000 HC PACU RECOVERY - FIRST 15 MIN: Performed by: SURGERY

## 2020-08-25 PROCEDURE — 7100000030 HC ASPR PHASE II RECOVERY - FIRST 15 MIN: Performed by: SURGERY

## 2020-08-25 PROCEDURE — 6360000002 HC RX W HCPCS: Performed by: NURSE ANESTHETIST, CERTIFIED REGISTERED

## 2020-08-25 PROCEDURE — 3700000000 HC ANESTHESIA ATTENDED CARE: Performed by: SURGERY

## 2020-08-25 PROCEDURE — 7100000031 HC ASPR PHASE II RECOVERY - ADDTL 15 MIN: Performed by: SURGERY

## 2020-08-25 PROCEDURE — 2580000003 HC RX 258: Performed by: ANESTHESIOLOGY

## 2020-08-25 PROCEDURE — 2500000003 HC RX 250 WO HCPCS: Performed by: NURSE ANESTHETIST, CERTIFIED REGISTERED

## 2020-08-25 PROCEDURE — 2709999900 HC NON-CHARGEABLE SUPPLY: Performed by: SURGERY

## 2020-08-25 PROCEDURE — U0002 COVID-19 LAB TEST NON-CDC: HCPCS

## 2020-08-25 PROCEDURE — 3609010400 HC COLONOSCOPY POLYPECTOMY HOT BIOPSY: Performed by: SURGERY

## 2020-08-25 PROCEDURE — 3700000001 HC ADD 15 MINUTES (ANESTHESIA): Performed by: SURGERY

## 2020-08-25 PROCEDURE — 7100000001 HC PACU RECOVERY - ADDTL 15 MIN: Performed by: SURGERY

## 2020-08-25 RX ORDER — DIPHENHYDRAMINE HYDROCHLORIDE 50 MG/ML
12.5 INJECTION INTRAMUSCULAR; INTRAVENOUS
Status: DISCONTINUED | OUTPATIENT
Start: 2020-08-25 | End: 2020-08-25 | Stop reason: HOSPADM

## 2020-08-25 RX ORDER — LABETALOL HYDROCHLORIDE 5 MG/ML
5 INJECTION, SOLUTION INTRAVENOUS EVERY 10 MIN PRN
Status: DISCONTINUED | OUTPATIENT
Start: 2020-08-25 | End: 2020-08-25 | Stop reason: HOSPADM

## 2020-08-25 RX ORDER — ONDANSETRON 2 MG/ML
INJECTION INTRAMUSCULAR; INTRAVENOUS PRN
Status: DISCONTINUED | OUTPATIENT
Start: 2020-08-25 | End: 2020-08-25 | Stop reason: SDUPTHER

## 2020-08-25 RX ORDER — PROPOFOL 10 MG/ML
INJECTION, EMULSION INTRAVENOUS PRN
Status: DISCONTINUED | OUTPATIENT
Start: 2020-08-25 | End: 2020-08-25 | Stop reason: SDUPTHER

## 2020-08-25 RX ORDER — ONDANSETRON 2 MG/ML
4 INJECTION INTRAMUSCULAR; INTRAVENOUS
Status: DISCONTINUED | OUTPATIENT
Start: 2020-08-25 | End: 2020-08-25 | Stop reason: HOSPADM

## 2020-08-25 RX ORDER — OXYCODONE HYDROCHLORIDE AND ACETAMINOPHEN 5; 325 MG/1; MG/1
2 TABLET ORAL PRN
Status: DISCONTINUED | OUTPATIENT
Start: 2020-08-25 | End: 2020-08-25 | Stop reason: HOSPADM

## 2020-08-25 RX ORDER — SODIUM CHLORIDE, SODIUM LACTATE, POTASSIUM CHLORIDE, CALCIUM CHLORIDE 600; 310; 30; 20 MG/100ML; MG/100ML; MG/100ML; MG/100ML
INJECTION, SOLUTION INTRAVENOUS CONTINUOUS
Status: DISCONTINUED | OUTPATIENT
Start: 2020-08-25 | End: 2020-08-25 | Stop reason: HOSPADM

## 2020-08-25 RX ORDER — OXYCODONE HYDROCHLORIDE AND ACETAMINOPHEN 5; 325 MG/1; MG/1
1 TABLET ORAL PRN
Status: DISCONTINUED | OUTPATIENT
Start: 2020-08-25 | End: 2020-08-25 | Stop reason: HOSPADM

## 2020-08-25 RX ORDER — DEXAMETHASONE SODIUM PHOSPHATE 4 MG/ML
INJECTION, SOLUTION INTRA-ARTICULAR; INTRALESIONAL; INTRAMUSCULAR; INTRAVENOUS; SOFT TISSUE PRN
Status: DISCONTINUED | OUTPATIENT
Start: 2020-08-25 | End: 2020-08-25 | Stop reason: SDUPTHER

## 2020-08-25 RX ORDER — LIDOCAINE HYDROCHLORIDE 10 MG/ML
INJECTION, SOLUTION EPIDURAL; INFILTRATION; INTRACAUDAL; PERINEURAL PRN
Status: DISCONTINUED | OUTPATIENT
Start: 2020-08-25 | End: 2020-08-25 | Stop reason: SDUPTHER

## 2020-08-25 RX ORDER — EPHEDRINE SULFATE/0.9% NACL/PF 50 MG/5 ML
SYRINGE (ML) INTRAVENOUS PRN
Status: DISCONTINUED | OUTPATIENT
Start: 2020-08-25 | End: 2020-08-25 | Stop reason: SDUPTHER

## 2020-08-25 RX ADMIN — LIDOCAINE HYDROCHLORIDE 50 MG: 10 INJECTION, SOLUTION EPIDURAL; INFILTRATION; INTRACAUDAL; PERINEURAL at 09:17

## 2020-08-25 RX ADMIN — Medication 10 MG: at 09:40

## 2020-08-25 RX ADMIN — PROPOFOL 100 MG: 10 INJECTION, EMULSION INTRAVENOUS at 09:19

## 2020-08-25 RX ADMIN — DEXAMETHASONE SODIUM PHOSPHATE 4 MG: 4 INJECTION, SOLUTION INTRA-ARTICULAR; INTRALESIONAL; INTRAMUSCULAR; INTRAVENOUS; SOFT TISSUE at 09:21

## 2020-08-25 RX ADMIN — Medication 15 MG: at 09:48

## 2020-08-25 RX ADMIN — ONDANSETRON 4 MG: 2 INJECTION INTRAMUSCULAR; INTRAVENOUS at 09:23

## 2020-08-25 RX ADMIN — PROPOFOL 200 MG: 10 INJECTION, EMULSION INTRAVENOUS at 09:17

## 2020-08-25 RX ADMIN — GLUCAGON HYDROCHLORIDE 1 MG: KIT at 09:39

## 2020-08-25 RX ADMIN — Medication 25 MG: at 09:56

## 2020-08-25 RX ADMIN — SODIUM CHLORIDE, POTASSIUM CHLORIDE, SODIUM LACTATE AND CALCIUM CHLORIDE: 600; 310; 30; 20 INJECTION, SOLUTION INTRAVENOUS at 08:30

## 2020-08-25 ASSESSMENT — PULMONARY FUNCTION TESTS
PIF_VALUE: 12
PIF_VALUE: 0
PIF_VALUE: 12
PIF_VALUE: 12
PIF_VALUE: 13
PIF_VALUE: 12
PIF_VALUE: 14
PIF_VALUE: 13
PIF_VALUE: 3
PIF_VALUE: 12
PIF_VALUE: 14
PIF_VALUE: 3
PIF_VALUE: 14
PIF_VALUE: 12
PIF_VALUE: 14
PIF_VALUE: 14
PIF_VALUE: 12
PIF_VALUE: 4
PIF_VALUE: 12
PIF_VALUE: 13
PIF_VALUE: 14
PIF_VALUE: 12
PIF_VALUE: 0
PIF_VALUE: 13
PIF_VALUE: 13
PIF_VALUE: 15
PIF_VALUE: 12
PIF_VALUE: 13
PIF_VALUE: 14
PIF_VALUE: 14
PIF_VALUE: 12
PIF_VALUE: 1
PIF_VALUE: 1
PIF_VALUE: 12
PIF_VALUE: 12
PIF_VALUE: 14
PIF_VALUE: 12
PIF_VALUE: 3
PIF_VALUE: 12
PIF_VALUE: 1
PIF_VALUE: 13
PIF_VALUE: 13
PIF_VALUE: 14
PIF_VALUE: 12
PIF_VALUE: 13
PIF_VALUE: 12
PIF_VALUE: 12
PIF_VALUE: 13
PIF_VALUE: 1
PIF_VALUE: 12

## 2020-08-25 ASSESSMENT — LIFESTYLE VARIABLES: SMOKING_STATUS: 1

## 2020-08-25 ASSESSMENT — PAIN SCALES - GENERAL
PAINLEVEL_OUTOF10: 0

## 2020-08-25 ASSESSMENT — PAIN - FUNCTIONAL ASSESSMENT: PAIN_FUNCTIONAL_ASSESSMENT: 0-10

## 2020-08-25 NOTE — ANESTHESIA POSTPROCEDURE EVALUATION
Department of Anesthesiology  Postprocedure Note    Patient: Isaac Joseph  MRN: [id number]  [place]: 1955  Date of evaluation: 8/25/2020  Time:  11:48 AM     Procedure Summary     Date:  08/25/20 Room / Location:  [place] [address] / [facility] ENDO    Anesthesia Start:  0547 Anesthesia Stop:  6506    Procedure:  COLONOSCOPY POLYPECTOMY SNARE/HOT BIOPSY WITH  RESOLUTION CLIPS X 2 (N/A ) Diagnosis:  (HISTORY OF COLON POLPYS (PAT ON ADMIT OFFICE TO [facility]))    Surgeon:  Mukesh Malcolm MD Responsible Provider:  Debra Catalan MD    Anesthesia Type:  general ASA Status:  2          Anesthesia Type: general    Lake Phase I: Lake Score: 10    Lake Phase II: Lake Score: 10    Last vitals: Reviewed and per EMR flowsheets.        Anesthesia Post Evaluation    Comments: POST- ANESTHESIA EVALUATION       Pt Name: Isaac Joseph  MRN: [id number]  YOB: 1955  Date of evaluation: 8/25/2020  Time:  11:48 AM      BP (!) 148/85   Pulse 89   Temp 96.6 °F (35.9 °C) (Temporal)   Resp 14   Ht 6' (1.829 m)   Wt 205 lb (93 kg)   SpO2 95%   BMI 27.80 kg/m²      Consciousness Level  Awake  Cardiopulmonary Status  Stable  Pain Adequately Treated YES  Nausea / Vomiting  NO  Adequate Hydration  YES  Anesthesia Related Complications NONE      Electronically signed by Debra Catalan MD on 8/25/2020 at 11:48 AM

## 2020-08-25 NOTE — OP NOTE
Scattered diverticulosis. Descending/Sigmoid colon: abnormal: Diverticulosis. Polyp at 15 cm removed with hot snare polypectomy and resolution clip application performed. Rectum/Anus: examined in normal and retroflexed positions and was abnormal: Rectal polyps x2 removed with hot snare polypectomy technique hot biopsy forceps. Withdrawal Time was (minutes): 25 prolonged procedure. Next screening colonoscopy: 3 years. If screening is less than 10 years the recommended reason is due:polyps    The colon was decompressed. While withdrawing the scope the above findings were verified and the scope was removed. The patient tolerated the procedure and conscious sedation without unusual events. In the recovery room patient was examined and remains hemodynamically stable. Discharge home when criteria met. Recommendations/Plan:   1. F/U Biopsies  2. F/U In Office as instructed  3. Discussed with the family  4. High fiber diet   5.  Precautions to avoid constipation    Electronically signed by Nae Castillo MD  on 8/25/2020 at 10:08 AM
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
## 2020-08-26 LAB — SURGICAL PATHOLOGY REPORT: NORMAL

## 2025-03-04 ENCOUNTER — APPOINTMENT (OUTPATIENT)
Dept: CT IMAGING | Age: 70
End: 2025-03-04
Payer: MEDICARE

## 2025-03-04 ENCOUNTER — APPOINTMENT (OUTPATIENT)
Dept: GENERAL RADIOLOGY | Age: 70
End: 2025-03-04
Payer: MEDICARE

## 2025-03-04 ENCOUNTER — APPOINTMENT (OUTPATIENT)
Dept: MRI IMAGING | Age: 70
End: 2025-03-04
Payer: MEDICARE

## 2025-03-04 ENCOUNTER — HOSPITAL ENCOUNTER (INPATIENT)
Age: 70
LOS: 2 days | Discharge: HOME | End: 2025-03-07
Attending: EMERGENCY MEDICINE | Admitting: STUDENT IN AN ORGANIZED HEALTH CARE EDUCATION/TRAINING PROGRAM
Payer: MEDICARE

## 2025-03-04 DIAGNOSIS — R40.4 TRANSIENT ALTERATION OF AWARENESS: Primary | ICD-10-CM
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
## 2025-03-04 DIAGNOSIS — I24.9 ACUTE CORONARY SYNDROME (HCC): ICD-10-CM

## 2025-03-04 DIAGNOSIS — I62.00 SUBDURAL HEMORRHAGE (HCC): ICD-10-CM

## 2025-03-04 LAB
ABO + RH BLD: NORMAL
ABO + RH BLD: NORMAL
ANION GAP SERPL CALCULATED.3IONS-SCNC: 10 MMOL/L (ref 9–16)
ANION GAP SERPL CALCULATED.3IONS-SCNC: 10 MMOL/L (ref 9–16)
ARM BAND NUMBER: NORMAL
ARM BAND NUMBER: NORMAL
BILIRUB UR QL STRIP: NEGATIVE
BILIRUB UR QL STRIP: NEGATIVE
BLOOD BANK SAMPLE EXPIRATION: NORMAL
BLOOD BANK SAMPLE EXPIRATION: NORMAL
BLOOD BANK SPECIMEN: ABNORMAL
BLOOD BANK SPECIMEN: ABNORMAL
BLOOD GROUP ANTIBODIES SERPL: NEGATIVE
BLOOD GROUP ANTIBODIES SERPL: NEGATIVE
BODY TEMPERATURE: 37
BODY TEMPERATURE: 37
BUN SERPL-MCNC: 19 MG/DL (ref 8–23)
BUN SERPL-MCNC: 19 MG/DL (ref 8–23)
CHLORIDE SERPL-SCNC: 106 MMOL/L (ref 98–107)
CHLORIDE SERPL-SCNC: 106 MMOL/L (ref 98–107)
CLARITY UR: CLEAR
CLARITY UR: CLEAR
CO2 SERPL-SCNC: 25 MMOL/L (ref 20–31)
CO2 SERPL-SCNC: 25 MMOL/L (ref 20–31)
COHGB MFR BLD: 2.1 % (ref 0–5)
COHGB MFR BLD: 2.1 % (ref 0–5)
COLOR UR: YELLOW
COLOR UR: YELLOW
COMMENT: ABNORMAL
COMMENT: ABNORMAL
CREAT SERPL-MCNC: 0.9 MG/DL (ref 0.7–1.2)
CREAT SERPL-MCNC: 0.9 MG/DL (ref 0.7–1.2)
ERYTHROCYTE [DISTWIDTH] IN BLOOD BY AUTOMATED COUNT: 14.3 % (ref 11.8–14.4)
ERYTHROCYTE [DISTWIDTH] IN BLOOD BY AUTOMATED COUNT: 14.3 % (ref 11.8–14.4)
ETHANOL PERCENT: <0.01 %
ETHANOL PERCENT: <0.01 %
ETHANOLAMINE SERPL-MCNC: <10 MG/DL (ref 0–0.08)
ETHANOLAMINE SERPL-MCNC: <10 MG/DL (ref 0–0.08)
FIBRINOGEN, FUNCTIONAL TEG: 20.1 MM (ref 15–32)
FIBRINOGEN, FUNCTIONAL TEG: 20.1 MM (ref 15–32)
FIO2 ON VENT: ABNORMAL %
FIO2 ON VENT: ABNORMAL %
GFR, ESTIMATED: 58 ML/MIN/1.73M2
GFR, ESTIMATED: 58 ML/MIN/1.73M2
GLUCOSE SERPL-MCNC: 95 MG/DL (ref 74–99)
GLUCOSE SERPL-MCNC: 95 MG/DL (ref 74–99)
GLUCOSE UR STRIP-MCNC: NEGATIVE MG/DL
GLUCOSE UR STRIP-MCNC: NEGATIVE MG/DL
HCO3 VENOUS: 21.6 MMOL/L (ref 24–30)
HCO3 VENOUS: 21.6 MMOL/L (ref 24–30)
HCT VFR BLD AUTO: 42.3 % (ref 40.7–50.3)
HCT VFR BLD AUTO: 42.3 % (ref 40.7–50.3)
HGB BLD-MCNC: 14.4 G/DL (ref 13–17)
HGB BLD-MCNC: 14.4 G/DL (ref 13–17)
HGB UR QL STRIP.AUTO: NEGATIVE
HGB UR QL STRIP.AUTO: NEGATIVE
INR PPP: 1
INR PPP: 1
KETONES UR STRIP-MCNC: NEGATIVE MG/DL
KETONES UR STRIP-MCNC: NEGATIVE MG/DL
LEUKOCYTE ESTERASE UR QL STRIP: NEGATIVE
LEUKOCYTE ESTERASE UR QL STRIP: NEGATIVE
LY30 (LYSIS) TEG: 1.4 % (ref 0–2.6)
LY30 (LYSIS) TEG: 1.4 % (ref 0–2.6)
MA(MAX CLOT) RAPID TEG: 57.5 MM (ref 52–70)
MA(MAX CLOT) RAPID TEG: 57.5 MM (ref 52–70)
MCH RBC QN AUTO: 31.6 PG (ref 25.2–33.5)
MCH RBC QN AUTO: 31.6 PG (ref 25.2–33.5)
MCHC RBC AUTO-ENTMCNC: 34 G/DL (ref 28.4–34.8)
MCHC RBC AUTO-ENTMCNC: 34 G/DL (ref 28.4–34.8)
MCV RBC AUTO: 92.8 FL (ref 82.6–102.9)
MCV RBC AUTO: 92.8 FL (ref 82.6–102.9)
NEGATIVE BASE EXCESS, VEN: 2.9 MMOL/L (ref 0–2)
NEGATIVE BASE EXCESS, VEN: 2.9 MMOL/L (ref 0–2)
NITRITE UR QL STRIP: NEGATIVE
NITRITE UR QL STRIP: NEGATIVE
NRBC BLD-RTO: 0 PER 100 WBC
NRBC BLD-RTO: 0 PER 100 WBC
O2 SAT, VEN: 58.6 % (ref 60–85)
O2 SAT, VEN: 58.6 % (ref 60–85)
PARTIAL THROMBOPLASTIN TIME: 28.4 SEC (ref 23–36.5)
PARTIAL THROMBOPLASTIN TIME: 28.4 SEC (ref 23–36.5)
PCO2 VENOUS: 37 MM HG (ref 39–55)
PCO2 VENOUS: 37 MM HG (ref 39–55)
PH UR STRIP: 6.5 [PH] (ref 5–8)
PH UR STRIP: 6.5 [PH] (ref 5–8)
PH VENOUS: 7.38 (ref 7.32–7.42)
PH VENOUS: 7.38 (ref 7.32–7.42)
PLATELET # BLD AUTO: 205 K/UL (ref 138–453)
PLATELET # BLD AUTO: 205 K/UL (ref 138–453)
PMV BLD AUTO: 10.3 FL (ref 8.1–13.5)
PMV BLD AUTO: 10.3 FL (ref 8.1–13.5)
PO2 VENOUS: 30.9 MM HG (ref 30–50)
PO2 VENOUS: 30.9 MM HG (ref 30–50)
POTASSIUM SERPL-SCNC: 3.9 MMOL/L (ref 3.7–5.3)
POTASSIUM SERPL-SCNC: 3.9 MMOL/L (ref 3.7–5.3)
PROT UR STRIP-MCNC: NEGATIVE MG/DL
PROT UR STRIP-MCNC: NEGATIVE MG/DL
PROTHROMBIN TIME: 13.2 SEC (ref 11.7–14.9)
PROTHROMBIN TIME: 13.2 SEC (ref 11.7–14.9)
RBC # BLD AUTO: 4.56 M/UL (ref 4.21–5.77)
RBC # BLD AUTO: 4.56 M/UL (ref 4.21–5.77)
REACTION TIME TEG: 5.5 MIN (ref 4.6–9.1)
REACTION TIME TEG: 5.5 MIN (ref 4.6–9.1)
SODIUM SERPL-SCNC: 141 MMOL/L (ref 136–145)
SODIUM SERPL-SCNC: 141 MMOL/L (ref 136–145)
SP GR UR STRIP: 1.05 (ref 1–1.03)
SP GR UR STRIP: 1.05 (ref 1–1.03)
TROPONIN I SERPL HS-MCNC: 32 NG/L (ref 0–22)
TROPONIN I SERPL HS-MCNC: 32 NG/L (ref 0–22)
TROPONIN I SERPL HS-MCNC: 35 NG/L (ref 0–22)
TROPONIN I SERPL HS-MCNC: 35 NG/L (ref 0–22)
UROBILINOGEN UR STRIP-ACNC: NORMAL EU/DL (ref 0–1)
UROBILINOGEN UR STRIP-ACNC: NORMAL EU/DL (ref 0–1)
WBC OTHER # BLD: 9.7 K/UL (ref 3.5–11.3)
WBC OTHER # BLD: 9.7 K/UL (ref 3.5–11.3)

## 2025-03-04 PROCEDURE — 80307 DRUG TEST PRSMV CHEM ANLYZR: CPT

## 2025-03-04 PROCEDURE — 6360000002 HC RX W HCPCS

## 2025-03-04 PROCEDURE — 82947 ASSAY GLUCOSE BLOOD QUANT: CPT

## 2025-03-04 PROCEDURE — 72156 MRI NECK SPINE W/O & W/DYE: CPT

## 2025-03-04 PROCEDURE — 70250 X-RAY EXAM OF SKULL: CPT

## 2025-03-04 PROCEDURE — G0480 DRUG TEST DEF 1-7 CLASSES: HCPCS

## 2025-03-04 PROCEDURE — 6360000004 HC RX CONTRAST MEDICATION

## 2025-03-04 PROCEDURE — 93005 ELECTROCARDIOGRAM TRACING: CPT | Performed by: INTERNAL MEDICINE

## 2025-03-04 PROCEDURE — 85027 COMPLETE CBC AUTOMATED: CPT

## 2025-03-04 PROCEDURE — 81003 URINALYSIS AUTO W/O SCOPE: CPT

## 2025-03-04 PROCEDURE — 85576 BLOOD PLATELET AGGREGATION: CPT

## 2025-03-04 PROCEDURE — 84520 ASSAY OF UREA NITROGEN: CPT

## 2025-03-04 PROCEDURE — 74018 RADEX ABDOMEN 1 VIEW: CPT

## 2025-03-04 PROCEDURE — A9576 INJ PROHANCE MULTIPACK: HCPCS

## 2025-03-04 PROCEDURE — 85610 PROTHROMBIN TIME: CPT

## 2025-03-04 PROCEDURE — 82565 ASSAY OF CREATININE: CPT

## 2025-03-04 PROCEDURE — 85347 COAGULATION TIME ACTIVATED: CPT

## 2025-03-04 PROCEDURE — 96365 THER/PROPH/DIAG IV INF INIT: CPT

## 2025-03-04 PROCEDURE — 86900 BLOOD TYPING SEROLOGIC ABO: CPT

## 2025-03-04 PROCEDURE — 82746 ASSAY OF FOLIC ACID SERUM: CPT

## 2025-03-04 PROCEDURE — 80051 ELECTROLYTE PANEL: CPT

## 2025-03-04 PROCEDURE — 85390 FIBRINOLYSINS SCREEN I&R: CPT

## 2025-03-04 PROCEDURE — 6810039001 HC L1 TRAUMA PRIORITY

## 2025-03-04 PROCEDURE — 70450 CT HEAD/BRAIN W/O DYE: CPT

## 2025-03-04 PROCEDURE — 86901 BLOOD TYPING SEROLOGIC RH(D): CPT

## 2025-03-04 PROCEDURE — 70496 CT ANGIOGRAPHY HEAD: CPT

## 2025-03-04 PROCEDURE — 86850 RBC ANTIBODY SCREEN: CPT

## 2025-03-04 PROCEDURE — 72125 CT NECK SPINE W/O DYE: CPT

## 2025-03-04 PROCEDURE — 71260 CT THORAX DX C+: CPT

## 2025-03-04 PROCEDURE — 82805 BLOOD GASES W/O2 SATURATION: CPT

## 2025-03-04 PROCEDURE — 85730 THROMBOPLASTIN TIME PARTIAL: CPT

## 2025-03-04 PROCEDURE — 85384 FIBRINOGEN ACTIVITY: CPT

## 2025-03-04 PROCEDURE — 70551 MRI BRAIN STEM W/O DYE: CPT

## 2025-03-04 PROCEDURE — 82607 VITAMIN B-12: CPT

## 2025-03-04 PROCEDURE — 99285 EMERGENCY DEPT VISIT HI MDM: CPT

## 2025-03-04 PROCEDURE — 84484 ASSAY OF TROPONIN QUANT: CPT

## 2025-03-04 RX ORDER — IOPAMIDOL 755 MG/ML
130 INJECTION, SOLUTION INTRAVASCULAR
Status: COMPLETED | OUTPATIENT
Start: 2025-03-04 | End: 2025-03-04

## 2025-03-04 RX ORDER — NICARDIPINE HYDROCHLORIDE 0.1 MG/ML
2.5-15 INJECTION INTRAVENOUS CONTINUOUS
Status: DISCONTINUED | OUTPATIENT
Start: 2025-03-04 | End: 2025-03-05

## 2025-03-04 RX ORDER — IOPAMIDOL 755 MG/ML
75 INJECTION, SOLUTION INTRAVASCULAR
Status: COMPLETED | OUTPATIENT
Start: 2025-03-04 | End: 2025-03-04

## 2025-03-04 RX ADMIN — NICARDIPINE HYDROCHLORIDE 5 MG/HR: 0.1 INJECTION INTRAVENOUS at 22:11

## 2025-03-04 RX ADMIN — IOPAMIDOL 75 ML: 755 INJECTION, SOLUTION INTRAVENOUS at 22:20

## 2025-03-04 RX ADMIN — IOPAMIDOL 130 ML: 755 INJECTION, SOLUTION INTRAVENOUS at 19:57

## 2025-03-04 RX ADMIN — GADOTERIDOL 15 ML: 279.3 INJECTION, SOLUTION INTRAVENOUS at 23:41

## 2025-03-04 ASSESSMENT — ENCOUNTER SYMPTOMS
CHEST TIGHTNESS: 0
SHORTNESS OF BREATH: 0
SORE THROAT: 0
ABDOMINAL PAIN: 0
ABDOMINAL DISTENTION: 0
NAUSEA: 0
DIARRHEA: 0
COLOR CHANGE: 0
BACK PAIN: 0
COUGH: 0

## 2025-03-04 ASSESSMENT — LIFESTYLE VARIABLES
HOW OFTEN DO YOU HAVE A DRINK CONTAINING ALCOHOL: PATIENT DECLINED
HOW MANY STANDARD DRINKS CONTAINING ALCOHOL DO YOU HAVE ON A TYPICAL DAY: PATIENT DECLINED

## 2025-03-05 ENCOUNTER — APPOINTMENT (OUTPATIENT)
Dept: GENERAL RADIOLOGY | Age: 70
End: 2025-03-05
Payer: MEDICARE

## 2025-03-05 PROBLEM — S06.9XAA: Status: ACTIVE | Noted: 2025-03-05

## 2025-03-05 PROBLEM — I62.00 SUBDURAL HEMORRHAGE (HCC): Status: ACTIVE | Noted: 2025-03-05

## 2025-03-05 PROBLEM — I24.9 ACUTE CORONARY SYNDROME (HCC): Status: ACTIVE | Noted: 2025-03-05

## 2025-03-05 PROBLEM — R55 SYNCOPE AND COLLAPSE: Status: ACTIVE | Noted: 2025-03-05

## 2025-03-05 PROBLEM — R40.4 TRANSIENT ALTERATION OF AWARENESS: Status: ACTIVE | Noted: 2025-03-05
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
## 2025-03-05 LAB
ALBUMIN SERPL-MCNC: 3.8 G/DL (ref 3.5–5.2)
ALBUMIN SERPL-MCNC: 3.8 G/DL (ref 3.5–5.2)
ALBUMIN/GLOB SERPL: 1.4 {RATIO} (ref 1–2.5)
ALBUMIN/GLOB SERPL: 1.4 {RATIO} (ref 1–2.5)
ALP SERPL-CCNC: 94 U/L (ref 40–129)
ALP SERPL-CCNC: 94 U/L (ref 40–129)
ALT SERPL-CCNC: 18 U/L (ref 10–50)
ALT SERPL-CCNC: 18 U/L (ref 10–50)
AMPHET UR QL SCN: NEGATIVE
AMPHET UR QL SCN: NEGATIVE
AST SERPL-CCNC: 24 U/L (ref 10–50)
AST SERPL-CCNC: 24 U/L (ref 10–50)
B PARAP IS1001 DNA NPH QL NAA+NON-PROBE: NOT DETECTED
B PARAP IS1001 DNA NPH QL NAA+NON-PROBE: NOT DETECTED
B PERT DNA SPEC QL NAA+PROBE: NOT DETECTED
B PERT DNA SPEC QL NAA+PROBE: NOT DETECTED
BARBITURATES UR QL SCN: NEGATIVE
BARBITURATES UR QL SCN: NEGATIVE
BENZODIAZ UR QL: NEGATIVE
BENZODIAZ UR QL: NEGATIVE
BILIRUB DIRECT SERPL-MCNC: 0.2 MG/DL (ref 0–0.2)
BILIRUB DIRECT SERPL-MCNC: 0.2 MG/DL (ref 0–0.2)
BILIRUB INDIRECT SERPL-MCNC: 0.3 MG/DL (ref 0–1)
BILIRUB INDIRECT SERPL-MCNC: 0.3 MG/DL (ref 0–1)
BILIRUB SERPL-MCNC: 0.5 MG/DL (ref 0–1.2)
BILIRUB SERPL-MCNC: 0.5 MG/DL (ref 0–1.2)
BODY TEMPERATURE: 37
BODY TEMPERATURE: 37
C PNEUM DNA NPH QL NAA+NON-PROBE: NOT DETECTED
C PNEUM DNA NPH QL NAA+NON-PROBE: NOT DETECTED
CANNABINOIDS UR QL SCN: NEGATIVE
CANNABINOIDS UR QL SCN: NEGATIVE
CHOLEST SERPL-MCNC: 184 MG/DL (ref 0–199)
CHOLEST SERPL-MCNC: 184 MG/DL (ref 0–199)
CHOLESTEROL/HDL RATIO: 3.4
CHOLESTEROL/HDL RATIO: 3.4
COCAINE UR QL SCN: NEGATIVE
COCAINE UR QL SCN: NEGATIVE
COHGB MFR BLD: 2 % (ref 0–5)
COHGB MFR BLD: 2 % (ref 0–5)
EST. AVERAGE GLUCOSE BLD GHB EST-MCNC: 103 MG/DL
EST. AVERAGE GLUCOSE BLD GHB EST-MCNC: 103 MG/DL
FENTANYL UR QL: NEGATIVE
FENTANYL UR QL: NEGATIVE
FIO2 ON VENT: ABNORMAL %
FIO2 ON VENT: ABNORMAL %
FLUAV RNA NPH QL NAA+NON-PROBE: NOT DETECTED
FLUAV RNA NPH QL NAA+NON-PROBE: NOT DETECTED
FLUBV RNA NPH QL NAA+NON-PROBE: NOT DETECTED
FLUBV RNA NPH QL NAA+NON-PROBE: NOT DETECTED
FOLATE SERPL-MCNC: 32.4 NG/ML (ref 4.8–24.2)
FOLATE SERPL-MCNC: 32.4 NG/ML (ref 4.8–24.2)
GLOBULIN SER CALC-MCNC: 2.8 G/DL
GLOBULIN SER CALC-MCNC: 2.8 G/DL
HADV DNA NPH QL NAA+NON-PROBE: NOT DETECTED
HADV DNA NPH QL NAA+NON-PROBE: NOT DETECTED
HBA1C MFR BLD: 5.2 % (ref 4–6)
HBA1C MFR BLD: 5.2 % (ref 4–6)
HCO3 VENOUS: 23.1 MMOL/L (ref 24–30)
HCO3 VENOUS: 23.1 MMOL/L (ref 24–30)
HCOV 229E RNA NPH QL NAA+NON-PROBE: NOT DETECTED
HCOV 229E RNA NPH QL NAA+NON-PROBE: NOT DETECTED
HCOV HKU1 RNA NPH QL NAA+NON-PROBE: NOT DETECTED
HCOV HKU1 RNA NPH QL NAA+NON-PROBE: NOT DETECTED
HCOV NL63 RNA NPH QL NAA+NON-PROBE: NOT DETECTED
HCOV NL63 RNA NPH QL NAA+NON-PROBE: NOT DETECTED
HCOV OC43 RNA NPH QL NAA+NON-PROBE: NOT DETECTED
HCOV OC43 RNA NPH QL NAA+NON-PROBE: NOT DETECTED
HDLC SERPL-MCNC: 54 MG/DL
HDLC SERPL-MCNC: 54 MG/DL
HMPV RNA NPH QL NAA+NON-PROBE: NOT DETECTED
HMPV RNA NPH QL NAA+NON-PROBE: NOT DETECTED
HPIV1 RNA NPH QL NAA+NON-PROBE: NOT DETECTED
HPIV1 RNA NPH QL NAA+NON-PROBE: NOT DETECTED
HPIV2 RNA NPH QL NAA+NON-PROBE: NOT DETECTED
HPIV2 RNA NPH QL NAA+NON-PROBE: NOT DETECTED
HPIV3 RNA NPH QL NAA+NON-PROBE: NOT DETECTED
HPIV3 RNA NPH QL NAA+NON-PROBE: NOT DETECTED
HPIV4 RNA NPH QL NAA+NON-PROBE: NOT DETECTED
HPIV4 RNA NPH QL NAA+NON-PROBE: NOT DETECTED
LDLC SERPL CALC-MCNC: 113 MG/DL (ref 0–100)
LDLC SERPL CALC-MCNC: 113 MG/DL (ref 0–100)
M PNEUMO DNA NPH QL NAA+NON-PROBE: NOT DETECTED
M PNEUMO DNA NPH QL NAA+NON-PROBE: NOT DETECTED
METHADONE UR QL: NEGATIVE
METHADONE UR QL: NEGATIVE
NEGATIVE BASE EXCESS, VEN: 0.4 MMOL/L (ref 0–2)
NEGATIVE BASE EXCESS, VEN: 0.4 MMOL/L (ref 0–2)
O2 SAT, VEN: 96.1 % (ref 60–85)
O2 SAT, VEN: 96.1 % (ref 60–85)
OPIATES UR QL SCN: NEGATIVE
OPIATES UR QL SCN: NEGATIVE
OXYCODONE UR QL SCN: NEGATIVE
OXYCODONE UR QL SCN: NEGATIVE
PCO2 VENOUS: 35.1 MM HG (ref 39–55)
PCO2 VENOUS: 35.1 MM HG (ref 39–55)
PCP UR QL SCN: NEGATIVE
PCP UR QL SCN: NEGATIVE
PH VENOUS: 7.44 (ref 7.32–7.42)
PH VENOUS: 7.44 (ref 7.32–7.42)
PO2 VENOUS: 78.8 MM HG (ref 30–50)
PO2 VENOUS: 78.8 MM HG (ref 30–50)
PROT SERPL-MCNC: 6.6 G/DL (ref 6.6–8.7)
PROT SERPL-MCNC: 6.6 G/DL (ref 6.6–8.7)
RSV RNA NPH QL NAA+NON-PROBE: NOT DETECTED
RSV RNA NPH QL NAA+NON-PROBE: NOT DETECTED
RV+EV RNA NPH QL NAA+NON-PROBE: NOT DETECTED
RV+EV RNA NPH QL NAA+NON-PROBE: NOT DETECTED
SARS-COV-2 RNA NPH QL NAA+NON-PROBE: NOT DETECTED
SARS-COV-2 RNA NPH QL NAA+NON-PROBE: NOT DETECTED
SPECIMEN DESCRIPTION: NORMAL
SPECIMEN DESCRIPTION: NORMAL
TEST INFORMATION: NORMAL
TEST INFORMATION: NORMAL
TRIGL SERPL-MCNC: 86 MG/DL
TRIGL SERPL-MCNC: 86 MG/DL
TSH SERPL DL<=0.05 MIU/L-ACNC: 2.23 UIU/ML (ref 0.27–4.2)
TSH SERPL DL<=0.05 MIU/L-ACNC: 2.23 UIU/ML (ref 0.27–4.2)
VIT B12 SERPL-MCNC: 440 PG/ML (ref 232–1245)
VIT B12 SERPL-MCNC: 440 PG/ML (ref 232–1245)
VLDLC SERPL CALC-MCNC: 17 MG/DL (ref 1–30)
VLDLC SERPL CALC-MCNC: 17 MG/DL (ref 1–30)

## 2025-03-05 PROCEDURE — 87040 BLOOD CULTURE FOR BACTERIA: CPT

## 2025-03-05 PROCEDURE — 83036 HEMOGLOBIN GLYCOSYLATED A1C: CPT

## 2025-03-05 PROCEDURE — 2500000003 HC RX 250 WO HCPCS: Performed by: NURSE PRACTITIONER

## 2025-03-05 PROCEDURE — 80061 LIPID PANEL: CPT

## 2025-03-05 PROCEDURE — 95816 EEG AWAKE AND DROWSY: CPT

## 2025-03-05 PROCEDURE — 92610 EVALUATE SWALLOWING FUNCTION: CPT

## 2025-03-05 PROCEDURE — 1200000000 HC SEMI PRIVATE

## 2025-03-05 PROCEDURE — 82805 BLOOD GASES W/O2 SATURATION: CPT

## 2025-03-05 PROCEDURE — 80076 HEPATIC FUNCTION PANEL: CPT

## 2025-03-05 PROCEDURE — 92523 SPEECH SOUND LANG COMPREHEN: CPT

## 2025-03-05 PROCEDURE — 87154 CUL TYP ID BLD PTHGN 6+ TRGT: CPT

## 2025-03-05 PROCEDURE — 6370000000 HC RX 637 (ALT 250 FOR IP): Performed by: STUDENT IN AN ORGANIZED HEALTH CARE EDUCATION/TRAINING PROGRAM

## 2025-03-05 PROCEDURE — 0202U NFCT DS 22 TRGT SARS-COV-2: CPT

## 2025-03-05 PROCEDURE — 71045 X-RAY EXAM CHEST 1 VIEW: CPT

## 2025-03-05 PROCEDURE — 99291 CRITICAL CARE FIRST HOUR: CPT | Performed by: INTERNAL MEDICINE

## 2025-03-05 PROCEDURE — 99222 1ST HOSP IP/OBS MODERATE 55: CPT | Performed by: STUDENT IN AN ORGANIZED HEALTH CARE EDUCATION/TRAINING PROGRAM

## 2025-03-05 PROCEDURE — 87205 SMEAR GRAM STAIN: CPT

## 2025-03-05 PROCEDURE — 84443 ASSAY THYROID STIM HORMONE: CPT

## 2025-03-05 PROCEDURE — 99221 1ST HOSP IP/OBS SF/LOW 40: CPT | Performed by: SURGERY

## 2025-03-05 PROCEDURE — 36415 COLL VENOUS BLD VENIPUNCTURE: CPT

## 2025-03-05 PROCEDURE — 6360000002 HC RX W HCPCS: Performed by: INTERNAL MEDICINE

## 2025-03-05 PROCEDURE — 96375 TX/PRO/DX INJ NEW DRUG ADDON: CPT

## 2025-03-05 PROCEDURE — 95819 EEG AWAKE AND ASLEEP: CPT | Performed by: PSYCHIATRY & NEUROLOGY

## 2025-03-05 RX ORDER — ONDANSETRON 2 MG/ML
4 INJECTION INTRAMUSCULAR; INTRAVENOUS EVERY 6 HOURS PRN
Status: DISCONTINUED | OUTPATIENT
Start: 2025-03-05 | End: 2025-03-07 | Stop reason: HOSPADM

## 2025-03-05 RX ORDER — TADALAFIL 20 MG/1
20 TABLET ORAL PRN
COMMUNITY

## 2025-03-05 RX ORDER — AMLODIPINE BESYLATE 5 MG/1
5 TABLET ORAL DAILY
Status: DISCONTINUED | OUTPATIENT
Start: 2025-03-05 | End: 2025-03-07

## 2025-03-05 RX ORDER — POTASSIUM CHLORIDE 7.45 MG/ML
10 INJECTION INTRAVENOUS PRN
Status: DISCONTINUED | OUTPATIENT
Start: 2025-03-05 | End: 2025-03-07 | Stop reason: HOSPADM

## 2025-03-05 RX ORDER — GABAPENTIN 300 MG/1
300 CAPSULE ORAL 3 TIMES DAILY
Status: DISCONTINUED | OUTPATIENT
Start: 2025-03-05 | End: 2025-03-07 | Stop reason: HOSPADM

## 2025-03-05 RX ORDER — SODIUM CHLORIDE 0.9 % (FLUSH) 0.9 %
10 SYRINGE (ML) INJECTION PRN
Status: DISCONTINUED | OUTPATIENT
Start: 2025-03-05 | End: 2025-03-07 | Stop reason: HOSPADM

## 2025-03-05 RX ORDER — PANTOPRAZOLE SODIUM 40 MG/1
40 TABLET, DELAYED RELEASE ORAL 2 TIMES DAILY
COMMUNITY

## 2025-03-05 RX ORDER — MAGNESIUM SULFATE 1 G/100ML
1000 INJECTION INTRAVENOUS PRN
Status: DISCONTINUED | OUTPATIENT
Start: 2025-03-05 | End: 2025-03-07 | Stop reason: HOSPADM

## 2025-03-05 RX ORDER — ONDANSETRON 4 MG/1
4 TABLET, ORALLY DISINTEGRATING ORAL EVERY 8 HOURS PRN
Status: DISCONTINUED | OUTPATIENT
Start: 2025-03-05 | End: 2025-03-07 | Stop reason: HOSPADM

## 2025-03-05 RX ORDER — NICARDIPINE HYDROCHLORIDE 0.1 MG/ML
2.5-15 INJECTION INTRAVENOUS CONTINUOUS
Status: DISCONTINUED | OUTPATIENT
Start: 2025-03-05 | End: 2025-03-05

## 2025-03-05 RX ORDER — PANTOPRAZOLE SODIUM 40 MG/1
40 TABLET, DELAYED RELEASE ORAL 2 TIMES DAILY
Status: DISCONTINUED | OUTPATIENT
Start: 2025-03-05 | End: 2025-03-07 | Stop reason: HOSPADM

## 2025-03-05 RX ORDER — SODIUM CHLORIDE 0.9 % (FLUSH) 0.9 %
5-40 SYRINGE (ML) INJECTION EVERY 12 HOURS SCHEDULED
Status: DISCONTINUED | OUTPATIENT
Start: 2025-03-05 | End: 2025-03-07 | Stop reason: HOSPADM

## 2025-03-05 RX ORDER — LABETALOL HYDROCHLORIDE 5 MG/ML
20 INJECTION, SOLUTION INTRAVENOUS EVERY 4 HOURS PRN
Status: DISCONTINUED | OUTPATIENT
Start: 2025-03-05 | End: 2025-03-07 | Stop reason: HOSPADM

## 2025-03-05 RX ORDER — POLYETHYLENE GLYCOL 3350 17 G/17G
17 POWDER, FOR SOLUTION ORAL DAILY PRN
Status: DISCONTINUED | OUTPATIENT
Start: 2025-03-05 | End: 2025-03-07 | Stop reason: HOSPADM

## 2025-03-05 RX ORDER — OXYBUTYNIN CHLORIDE 10 MG/1
10 TABLET, EXTENDED RELEASE ORAL DAILY
Status: DISCONTINUED | OUTPATIENT
Start: 2025-03-05 | End: 2025-03-07 | Stop reason: HOSPADM

## 2025-03-05 RX ORDER — ACETAMINOPHEN 325 MG/1
650 TABLET ORAL EVERY 6 HOURS PRN
Status: DISCONTINUED | OUTPATIENT
Start: 2025-03-05 | End: 2025-03-07 | Stop reason: HOSPADM

## 2025-03-05 RX ORDER — OXYBUTYNIN CHLORIDE 10 MG/1
10 TABLET, EXTENDED RELEASE ORAL DAILY
COMMUNITY

## 2025-03-05 RX ORDER — POTASSIUM CHLORIDE 1500 MG/1
40 TABLET, EXTENDED RELEASE ORAL PRN
Status: DISCONTINUED | OUTPATIENT
Start: 2025-03-05 | End: 2025-03-07 | Stop reason: HOSPADM

## 2025-03-05 RX ORDER — HYDRALAZINE HYDROCHLORIDE 20 MG/ML
10 INJECTION INTRAMUSCULAR; INTRAVENOUS EVERY 6 HOURS PRN
Status: DISCONTINUED | OUTPATIENT
Start: 2025-03-05 | End: 2025-03-05

## 2025-03-05 RX ORDER — SODIUM CHLORIDE 9 MG/ML
INJECTION, SOLUTION INTRAVENOUS PRN
Status: DISCONTINUED | OUTPATIENT
Start: 2025-03-05 | End: 2025-03-07 | Stop reason: HOSPADM

## 2025-03-05 RX ORDER — ACETAMINOPHEN 500 MG
500 TABLET ORAL EVERY 6 HOURS PRN
COMMUNITY

## 2025-03-05 RX ORDER — GABAPENTIN 300 MG/1
300 CAPSULE ORAL 3 TIMES DAILY
COMMUNITY

## 2025-03-05 RX ORDER — ACETAMINOPHEN 650 MG/1
650 SUPPOSITORY RECTAL EVERY 6 HOURS PRN
Status: DISCONTINUED | OUTPATIENT
Start: 2025-03-05 | End: 2025-03-07 | Stop reason: HOSPADM

## 2025-03-05 RX ORDER — CELECOXIB 200 MG/1
200 CAPSULE ORAL DAILY
Status: ON HOLD | COMMUNITY
End: 2025-03-07 | Stop reason: HOSPADM

## 2025-03-05 RX ORDER — AMLODIPINE BESYLATE 5 MG/1
5 TABLET ORAL DAILY
Status: ON HOLD | COMMUNITY
End: 2025-03-07

## 2025-03-05 RX ADMIN — GABAPENTIN 300 MG: 300 CAPSULE ORAL at 14:22

## 2025-03-05 RX ADMIN — PANTOPRAZOLE SODIUM 40 MG: 40 TABLET, DELAYED RELEASE ORAL at 20:38

## 2025-03-05 RX ADMIN — OXYBUTYNIN CHLORIDE 10 MG: 10 TABLET, EXTENDED RELEASE ORAL at 14:22

## 2025-03-05 RX ADMIN — SODIUM CHLORIDE, PRESERVATIVE FREE 10 ML: 5 INJECTION INTRAVENOUS at 08:28

## 2025-03-05 RX ADMIN — HYDRALAZINE HYDROCHLORIDE 10 MG: 20 INJECTION INTRAMUSCULAR; INTRAVENOUS at 05:53

## 2025-03-05 RX ADMIN — AMLODIPINE BESYLATE 5 MG: 5 TABLET ORAL at 14:22

## 2025-03-05 RX ADMIN — SODIUM CHLORIDE, PRESERVATIVE FREE 10 ML: 5 INJECTION INTRAVENOUS at 20:41

## 2025-03-05 RX ADMIN — PANTOPRAZOLE SODIUM 40 MG: 40 TABLET, DELAYED RELEASE ORAL at 14:22

## 2025-03-05 RX ADMIN — NICARDIPINE HYDROCHLORIDE 5 MG/HR: 0.1 INJECTION INTRAVENOUS at 06:27

## 2025-03-05 RX ADMIN — GABAPENTIN 300 MG: 300 CAPSULE ORAL at 20:38

## 2025-03-05 ASSESSMENT — ENCOUNTER SYMPTOMS
VOMITING: 0
SORE THROAT: 0
CHEST TIGHTNESS: 0
ABDOMINAL DISTENTION: 0
SHORTNESS OF BREATH: 0
DIARRHEA: 0
COLOR CHANGE: 0
ABDOMINAL PAIN: 0
COUGH: 0
NAUSEA: 0
BACK PAIN: 0
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
## 2025-03-05 NOTE — PROGRESS NOTES
C- Spine Evaluation for Spine Clearance:    Pt is a 70 y.o. male who was admitted on 3/4/2025 s/p FFSH. Pt w/ complaints of waxing and waning confusion.      C-Spine precautions of C-collar with spinal neutrality maintained since arrival with current exam directed at further evaluation of spine for clearance purposes.    Pt chart and current images reviewed.  CT C-Spine negative for acute fracture, subluxation, or traumatic injury.  Patient does not have a distracting injury, is not acutely intoxicated and is alert, oriented and fully able to participate in exam.      Pt denies c-spine pain while resting in c-collar.  C-collar removed w/ c-spine neutrality maintained.  Pt denies midline pain with palpation of spinous processes and axial loading.  Pt demonstrated full flexion, extension, and SB ROM without complaints of pain.       TLS precautions of supine position maintained since arrival.  Pt denies midline pain with palpation of spinous processes. CT dorsal lumbar negative for acute fracture, subluxation, or traumatic injury.      C-spine is considered cleared w/out need for further imaging, evaluation, or continuation of c-collar.  TLS considered clear w/out need for further imagine, evaluation, or continuation of supine bedrest precautions.    Electronically signed by Ryan Fulton MD on 3/4/2025 at 9:15 PM

## 2025-03-05 NOTE — ED NOTES
Pt provided breakfast tray   Pt provided warm blanket per request   Will continue with plan of care

## 2025-03-05 NOTE — CONSULTS
[facility] Neurology   IN-PATIENT SERVICE   [facility]    Neurology Consult Note            Date:   3/5/2025  Patient name:  Modesto Chan  Date of admission:  3/4/2025  7:38 PM  MRN:   [id number]  Account:  [id number]  YOB: 1955  PCP:    No primary care provider on file.  Room:   [facility]  Code Status:    No Order    Chief Complaint:     Chief Complaint   Patient presents with    Fall       History Obtained From:     patient    History of Present Illness:     70-year-old male presenting after experiencing a fall.  History of  shunt.  Wife mentions she was in a different room and she heard a thump and went to see that her  was on the floor.  Was unclear the circumstances of why he fell however he appeared completely mentally intact afterwards and then he took a nap and when he woke up he was confused and had a fluent aphasia.  EMS were then called and on the way he appeared to have improved.  Currently he does have very mild confusion but is otherwise nonfocal neurologically, he does have hyperreflexia in the lower extremities more so on the right side with crossover.  Otherwise CT head appeared to show no hydrocephalus and CTA demonstrated some stenosis of bilateral ICA but otherwise no occlusions or any concerning signs.  X-ray also showed that the shunt is in place.     Per patient account he mentions that due to his health problems he fell but he cannot pinpoint exactly why he dropped to the ground          LKW: 3:30pm  NIHSS: 0  Modified [place] Scale: 1  SBP: 196  Glucose: 95  CT head without contrast: normal, shunt present   TNK: not given, outside window  Endovascular: /     Past Medical History:     No past medical history on file.     Past Surgical History:     No past surgical history on file.     Medications Prior to Admission:     Prior to Admission medications    Not on File        Allergies:     Patient has no allergy information on record.    Social  Normal facial sensation   VII    -     Normal facial symmetry  VIII   -     Intact hearing   IX,X -     Symmetrical palate  XI    -     Symmetrical shoulder shrug  XII   -     Midline tongue, no atrophy    MOTOR FUNCTION: RUE: Significant for good strength of grade 5/5 in proximal and distal muscle groups   LUE: Significant for good strength of grade 5/5 in proximal and distal muscle groups   RLE: Significant for good strength of grade 5/5 in proximal and distal muscle groups   LLE: Significant for good strength of grade 5/5 in proximal and distal muscle groups      Normal bulk, normal tone and no involuntary movements, no tremor   SENSORY FUNCTION:  Normal touch, normal pinprick, normal vibration, normal proprioception   CEREBELLAR FUNCTION:  Intact fine motor control over upper limbs and lower limbs   REFLEX FUNCTION:  Symmetric in upper and lower extremities, no Babinski sign   STATION and GAIT deferred     INITIAL NIH STROKE SCALE:    1a.  Level of consciousness:  0 - alert; keenly responsive  1b.  Level of consciousness questions:  0 - answers both questions correctly  1c.  Level of consciousness questions:  0 - performs both tasks correctly  2.    Best Gaze:  0 - normal  3.    Visual:  0 - no visual loss  4.    Facial Palsy:  0 - normal symmetric movement  5a.  Motor left arm:  0 - no drift, limb holds 90 (or 45) degrees for full 10 seconds  5b.  Motor right arm:  0 - no drift, limb holds 90 (or 45) degrees for full 10 seconds  6a.  Motor left le - no drift; leg holds 30 degree position for full 5 seconds  6b.  Motor right le - no drift; leg holds 30 degree position for full 5 seconds  7.    Limb Ataxia:  0 - absent  8.    Sensory:  0 - normal; no sensory loss  9.    Best Language:  0 - no aphasia, normal  10.  Dysarthria:  0 - normal  11.  Extinction and Inattention:  0 - no abnormality    TOTAL:  0    Investigations:      Laboratory Testing:  Recent Results (from the past 24 hour(s))   Trauma Panel     Collection Time: 03/04/25  7:53 PM   Result Value Ref Range    Ethanol Lvl <10 <10 mg/dL    Ethanol percent <0.010 <0.010 %    Blood Bank Specimen BILL FOR SERVICES PERFORMED     BUN 19 8 - 23 mg/dL    WBC 9.7 3.5 - 11.3 k/uL    RBC 4.56 4.21 - 5.77 m/uL    Hemoglobin 14.4 13.0 - 17.0 g/dL    Hematocrit 42.3 40.7 - 50.3 %    MCV 92.8 82.6 - 102.9 fL    MCH 31.6 25.2 - 33.5 pg    MCHC 34.0 28.4 - 34.8 g/dL    RDW 14.3 11.8 - 14.4 %    Platelets 205 138 - 453 k/uL    MPV 10.3 8.1 - 13.5 fL    NRBC Automated 0.0 0.0 per 100 WBC    Creatinine 0.9 0.7 - 1.2 mg/dL    Est, Glom Filt Rate 58 (L) >60 mL/min/1.73m2    Glucose 95 74 - 99 mg/dL    Sodium 141 136 - 145 mmol/L    Potassium 3.9 3.7 - 5.3 mmol/L    Chloride 106 98 - 107 mmol/L    CO2 25 20 - 31 mmol/L    Anion Gap 10 9 - 16 mmol/L    Protime 13.2 11.7 - 14.9 sec    INR 1.0     APTT 28.4 23.0 - 36.5 sec    pH, Kelvin 7.385 7.320 - 7.420    pCO2, Kelvin 37.0 (L) 39 - 55 mm Hg    PO2, Kelvin 30.9 30 - 50 mm Hg    HCO3, Venous 21.6 (L) 24 - 30 mmol/L    Negative Base Excess, Kelvin 2.9 (H) 0.0 - 2.0 mmol/L    O2 Sat, Kelvin 58.6 (L) 60.0 - 85.0 %    Carboxyhemoglobin 2.1 0 - 5 %    Pt Temp 37.0     FIO2 INFORMATION NOT PROVIDED    TEG Global Hemostasis with Lysis    Collection Time: 03/04/25  7:53 PM   Result Value Ref Range    Reaction Time TEG 5.5 4.6 - 9.1 min    LY30(Lysis) TEG 1.4 0.0 - 2.6 %    MA(Max Clot) Rapid TEG 57.5 52.0 - 70 mm    Fibrinogen, Functional TEG 20.1 15.0 - 32.0 mm   Troponin    Collection Time: 03/04/25  7:53 PM   Result Value Ref Range    Troponin, High Sensitivity 32 (H) 0 - 22 ng/L   TYPE AND SCREEN    Collection Time: 03/04/25  8:02 PM   Result Value Ref Range    Blood Bank Sample Expiration 03/07/2025,2359     Arm Band Number [id number]     ABO/Rh O POSITIVE     Antibody Screen NEGATIVE    Troponin    Collection Time: 03/04/25  9:34 PM   Result Value Ref Range    Troponin, High Sensitivity 35 (H) 0 - 22 ng/L   Urinalysis with Reflex to Culture

## 2025-03-05 NOTE — ED NOTES
Pt arriving to ed via ems  Pt had several falls and episodes near syncope. Prior pt had forgotten what they were doing, forgot their name and was altered for EMS  Upon arrival pt was able to answer all questions appropriately but would go in and out mentation   Hx of r. Side vascular shunt with recent adjustment  Denies injury or blood thinners

## 2025-03-05 NOTE — ED PROVIDER NOTES
[facility] EMERGENCY DEPARTMENT  Emergency Department  Emergency Medicine Resident Turn-Over     Note Started: 12:57 AM EST    Care of Dx Trauma [place] was assumed from Dr. Simon and is being seen for Fall  .  The patient's initial evaluation and plan have been discussed with the prior provider who initially evaluated the patient.     EMERGENCY DEPARTMENT COURSE / MEDICAL DECISION MAKING:       MEDICATIONS GIVEN:  Orders Placed This Encounter   Medications    iopamidol (ISOVUE-370) 76 % injection 130 mL    niCARdipine (CARDENE) 20 mg in 0.9 % sodium chloride 200 mL solution     Order Specific Question:   Titrate Infusion?     Answer:   Yes     Order Specific Question:   Initial Infusion Rate:     Answer:   5 mg/hr     Order Specific Question:   Goal of Therapy is:     Answer:   SBP less than 160 mmHg     Order Specific Question:   Contact Provider if:     Answer:   Patient is receiving the maximum dose and is not achieving the goal of therapy    iopamidol (ISOVUE-370) 76 % injection 75 mL    gadoteridol (PROHANCE) injection 15 mL       LABS / RADIOLOGY:     Labs Reviewed   TRAUMA PANEL - Abnormal; Notable for the following components:       Result Value    Est, Glom Filt Rate 58 (*)     pCO2, Kelvin 37.0 (*)     HCO3, Venous 21.6 (*)     Negative Base Excess, Kelvin 2.9 (*)     O2 Sat, Kelvin 58.6 (*)     All other components within normal limits   TROPONIN - Abnormal; Notable for the following components:    Troponin, High Sensitivity 35 (*)     All other components within normal limits   TROPONIN - Abnormal; Notable for the following components:    Troponin, High Sensitivity 32 (*)     All other components within normal limits   URINALYSIS WITH REFLEX TO CULTURE - Abnormal; Notable for the following components:    Specific Gravity, UA 1.046 (*)     All other components within normal limits   TEG GLOBAL HEMOSTASIS WITH LYSIS   TYPE AND SCREEN       MRI LIMITED BRAIN    Result Date: 3/5/2025  1. Limited MRI  vertebrae with a transitional S1 level.  There is mild retrolisthesis at L3-L4 and L4-L5. Vertebral body heights appear normal.  Spondylolysis is incidentally noted on the right at S1.  No acute fracture or destructive bone lesion is identified. DEGENERATIVE CHANGES: Multilevel degenerate disc disease is worst and moderate at L4-L5 and L5-S1.  There is multilevel facet arthropathy in the spine.  No severe canal narrowing is identified. SOFT TISSUES/RETROPERITONEUM: There no acute findings in the paraspinal soft tissues.     1. No acute lumbar spine fracture. 2. Spinal degenerative changes as described. Mild retrolisthesis at L3-L4 and L4-L5 is likely degenerative.     CT CHEST ABDOMEN PELVIS W CONTRAST Additional Contrast? None    Result Date: 3/4/2025  EXAMINATION: CT OF THE CHEST, ABDOMEN, AND PELVIS WITH CONTRAST 3/4/2025 7:55 pm TECHNIQUE: CT of the chest, abdomen and pelvis was performed with the administration of intravenous contrast. Multiplanar reformatted images are provided for review. Automated exposure control, iterative reconstruction, and/or weight based adjustment of the mA/kV was utilized to reduce the radiation dose to as low as reasonably achievable. COMPARISON: None HISTORY: ORDERING SYSTEM PROVIDED HISTORY: trauma TECHNOLOGIST PROVIDED HISTORY: trauma FINDINGS: Chest: Mediastinum: No enlarged lymphadenopathy.  Small lymph nodes are scattered. No esophageal thickening. Heart: Heart size is normal. No pericardial effusion. Great vessels: the great vessels are patent and unremarkable. Aorta: Aorta is patent and unremarkable.  Negative for aneurysm or dissection. Lungs/pleura: Elevation of the right hemidiaphragm.  Large diaphragmatic hernia.  Mild bibasilar atelectasis.  Consolidation in the left lower lobe which has the appearance of atelectasis.  Negative for pneumothorax. Negative for subcutaneous emphysema. Soft Tissues: No enlarged axillary adenopathy. No subcutaneous mass.  Thyroid is

## 2025-03-05 NOTE — CONSULTS
Department of Neurosurgery                                            Nurse Practitioner Consult Note      Reason for Consult:  possible epidural of subdural hemorrhage surrounding the cervical thecal sac   Requesting Physician:  kelsea   Neurosurgeon:   [] Dr. Frederick  [] Dr. Prasad  [x] Dr. Andino  [] Dr. Sosa    Neurosurgery notified of consult at: 0100  Neurosurgery evaluation begun: 0115    History Obtained From:  patient, spouse    CHIEF COMPLAINT:         Chief Complaint   Patient presents with    Fall       HISTORY OF PRESENT ILLNESS:       The patient is a 70 y.o. male who presents to the emergency department after experiencing a fall from standing per wife she heard a thump and came in to see her  on the floor.   Patient reported to her that he was bending over to feed the dog when he toppled forward. Patient has had difficulty with his gait and falls prior and has been following with Dr RAMIREZ Hannah at [facility] for NPH, he is status post  shunt placement Medtronic Strata in Sept 2024 shortly there after he had posterior cervical decompression and fusion C7-T1 and has evidence on imaging of decompression of C3-7. After patients fall he was able to get up and went in to take a nap, when he awoke from the nap he was confused and had aphasia. Family called EMS. Trauma as well as neurology were involved. Originally neurosurgery was asked to aide in the determination of the type of shunt and setting as well as re evaluation post MRI. Skull xray perpendicular to the valve was obtained and shunt was identified of Medtronic strata set at 0.5. Shunt was evaluated post MRI and was dialed back to 0.5.   Once MRI images were completed neurosurgery was again asked to evaluate patient this time for MRI cervical reading of evidence of epidural or subdural hemorrhage surrounding the cervical thecal sac starting from the C1 level and extending to the lower most limit of T3.     On exam patient is at his  absent    Gait:  deferred    SKIN: no rash on exposed skin       LABS AND IMAGING:     CBC with Differential:    Lab Results   Component Value Date/Time    WBC 9.7 03/04/2025 07:53 PM    RBC 4.56 03/04/2025 07:53 PM    HGB 14.4 03/04/2025 07:53 PM    HCT 42.3 03/04/2025 07:53 PM     03/04/2025 07:53 PM    MCV 92.8 03/04/2025 07:53 PM    MCH 31.6 03/04/2025 07:53 PM    MCHC 34.0 03/04/2025 07:53 PM    RDW 14.3 03/04/2025 07:53 PM     BMP:    Lab Results   Component Value Date/Time     03/04/2025 07:53 PM    K 3.9 03/04/2025 07:53 PM     03/04/2025 07:53 PM    CO2 25 03/04/2025 07:53 PM    BUN 19 03/04/2025 07:53 PM    CREATININE 0.9 03/04/2025 07:53 PM    LABGLOM 58 03/04/2025 07:53 PM    GLUCOSE 95 03/04/2025 07:53 PM       Radiology Review:      MRI CERVICAL SPINE W WO CONTRAST   Preliminary Result   1. Evidence of epidural or subdural hemorrhage surrounding the cervical   thecal sac starting from the C1 level and extended to lower most limit of the   sagittal images there is down to T3 level.   2. Evidence of previous total laminectomy from C3 to C7 level.   3. Evidence of previous posterior spinal fusion with bilateral posterolateral   rods, interconnecting bilateral transpedicular screws at C7-T1 level.   4. No definite cord compression.   5. Multilevel degenerative disc disease and facet osteoarthritis as described   in detail above.   6. No abnormal enhancement of the cervical spine.   7. Follow-up MRI of thoracic spine and lumbar spine, recommended.         MRI LIMITED BRAIN   Preliminary Result   1. Limited MRI of the brain due to prominent ferromagnetic artifacts arising   from the right parietal ventriculoperitoneal shunt tube and from the external   components of the ventriculoperitoneal shunt.   2. No evidence of acute infarction, acute ischemic process or restricted   diffusion in the visualized brain parenchyma.   3. Diffuse dural thickening with T2 FLAIR hyperintense signals around

## 2025-03-05 NOTE — ED NOTES
Writer perfect served admitting team regarding patient passing swallow study   Will continue with plan of care

## 2025-03-05 NOTE — PROGRESS NOTES
ED to inpatient nurses report      Chief Complaint:  Chief Complaint   Patient presents with    Fall     Present to ED from: Home    MOA:     LOC: alert and orientated to name, place, date  Mobility: Requires assistance * 1  Oxygen Baseline: RA    Current needs required: RA   Pending ED orders: N/a  Present condition: Stable    Why did the patient come to the ED? Pt arriving to ed via ems  Pt had several falls and episodes near syncope. Prior pt had forgotten what they were doing, forgot their name and was altered for EMS  Upon arrival pt was able to answer all questions appropriately but would go in and out mentation   Hx of r. Side vascular shunt with recent adjustment  Denies injury or blood thinners  Cardene gtt off  What is the plan? BP control, echo  Any procedures or intervention occur? EEG was complete  Any safety concerns?? Fall risk    Mental Status:       Psych Assessment:      Vital signs   Vitals:    03/05/25 1157 03/05/25 1230 03/05/25 1307 03/05/25 1422   BP:  131/74 106/77 (!) 141/78   Pulse: 81 79 80    Resp: 17 22 20    Temp:   98 °F (36.7 °C)    TempSrc:   Oral    SpO2: 94% 96% 92%    Weight:       Height:            Vitals:  Patient Vitals for the past 24 hrs:   BP Temp Temp src Pulse Resp SpO2 Height Weight   03/05/25 1422 (!) 141/78 -- -- -- -- -- -- --   03/05/25 1307 106/77 98 °F (36.7 °C) Oral 80 20 92 % -- --   03/05/25 1230 131/74 -- -- 79 22 96 % -- --   03/05/25 1157 -- -- -- 81 17 94 % -- --   03/05/25 1156 -- -- -- 78 16 94 % -- --   03/05/25 1155 -- -- -- 82 18 95 % -- --   03/05/25 1154 -- -- -- 79 17 94 % -- --   03/05/25 1153 -- -- -- 79 16 93 % -- --   03/05/25 1152 -- -- -- 83 17 90 % -- --   03/05/25 1151 -- -- -- 81 18 93 % -- --   03/05/25 1145 118/72 -- -- 79 19 94 % -- --   03/05/25 1100 114/60 -- -- 74 16 97 % -- --   03/05/25 1045 118/69 -- -- 80 16 95 % -- --   03/05/25 1039 129/77 -- -- 100 14 97 % -- --   03/05/25 1037 134/72 -- -- 89 23 93 % -- --   03/05/25 1033  (APRESOLINE) injection 10 mg    sodium chloride flush 0.9 % injection 5-40 mL    sodium chloride flush 0.9 % injection 10 mL    0.9 % sodium chloride infusion    OR Linked Order Group     potassium chloride (KLOR-CON M) extended release tablet 40 mEq     potassium bicarb-citric acid (EFFER-K) effervescent tablet 40 mEq     potassium chloride 10 mEq/100 mL IVPB (Peripheral Line)    magnesium sulfate 1000 mg in dextrose 5% 100 mL IVPB    OR Linked Order Group     ondansetron (ZOFRAN-ODT) disintegrating tablet 4 mg     ondansetron (ZOFRAN) injection 4 mg    polyethylene glycol (GLYCOLAX) packet 17 g    OR Linked Order Group     acetaminophen (TYLENOL) tablet 650 mg     acetaminophen (TYLENOL) suppository 650 mg    amLODIPine (NORVASC) tablet 5 mg    gabapentin (NEURONTIN) capsule 300 mg    oxyBUTYnin (DITROPAN-XL) extended release tablet 10 mg    pantoprazole (PROTONIX) tablet 40 mg       SURGICAL HISTORY     No past surgical history on file.    PAST MEDICAL HISTORY     No past medical history on file.    Labs:  Labs Reviewed   TRAUMA PANEL - Abnormal; Notable for the following components:       Result Value    Est, Glom Filt Rate 58 (*)     pCO2, Kelvin 37.0 (*)     HCO3, Venous 21.6 (*)     Negative Base Excess, Kelvin 2.9 (*)     O2 Sat, Kelvin 58.6 (*)     All other components within normal limits   TROPONIN - Abnormal; Notable for the following components:    Troponin, High Sensitivity 35 (*)     All other components within normal limits   TROPONIN - Abnormal; Notable for the following components:    Troponin, High Sensitivity 32 (*)     All other components within normal limits   URINALYSIS WITH REFLEX TO CULTURE - Abnormal; Notable for the following components:    Specific Gravity, UA 1.046 (*)     All other components within normal limits   BLOOD GAS, VENOUS - Abnormal; Notable for the following components:    pH, Kelvin 7.437 (*)     pCO2, Kelvin 35.1 (*)     PO2, Kelvin 78.8 (*)     HCO3, Venous 23.1 (*)     O2 Sat, Kelvin 96.1  (*)     All other components within normal limits   LIPID PANEL - Abnormal; Notable for the following components:    LDL Cholesterol 113 (*)     All other components within normal limits   VITAMIN B12 & FOLATE - Abnormal; Notable for the following components:    Folate 32.4 (*)     All other components within normal limits   CULTURE, BLOOD 1   CULTURE, BLOOD 1   RESPIRATORY PANEL, MOLECULAR, WITH COVID-19   TEG GLOBAL HEMOSTASIS WITH LYSIS   HEPATIC FUNCTION PANEL   HEMOGLOBIN A1C   TSH REFLEX TO FT4   URINE DRUG SCREEN   TYPE AND SCREEN       Electronically signed by Radha Sam RN on 3/5/2025 at 4:28 PM

## 2025-03-05 NOTE — ED PROVIDER NOTES
[facility]  FACULTY HANDOFF       Handoff taken on the following patient from prior Attending Physician:  Pt Name: Dx Trauma [facility]  PCP:  No primary care provider on file.    Attestation  I was available and discussed any additional care issues that arose and coordinated the management plans with the resident(s) caring for the patient during my duty period. Any areas of disagreement with resident's documentation of care or procedures are noted on the chart. I was personally present for the key portions of any/all procedures during my duty period. I have documented in the chart those procedures where I was not present during the key portions.         CHIEF COMPLAINT       Chief Complaint   Patient presents with    Fall         CURRENT MEDICATIONS     Previous Medications  Previous Medications    No medications on file       Encounter Medications  Orders Placed This Encounter   Medications    iopamidol (ISOVUE-370) 76 % injection 130 mL    niCARdipine (CARDENE) 20 mg in 0.9 % sodium chloride 200 mL solution     Order Specific Question:   Titrate Infusion?     Answer:   Yes     Order Specific Question:   Initial Infusion Rate:     Answer:   5 mg/hr     Order Specific Question:   Goal of Therapy is:     Answer:   SBP less than 160 mmHg     Order Specific Question:   Contact Provider if:     Answer:   Patient is receiving the maximum dose and is not achieving the goal of therapy    iopamidol (ISOVUE-370) 76 % injection 75 mL       ALLERGIES     has no allergies on file.      RECENT VITALS:   Temp: 97.4 °F (36.3 °C),  Pulse: 82, Respirations: 19, BP: 115/88    RADIOLOGY:   XR SKULL (<4 VIEWS)   Final Result    shunt valve seen.         CTA HEAD NECK W CONTRAST   Final Result   No acute arterial injury detected in the head or neck.      Epidural density within the upper C-spine, resulting in severe narrowing of   the thecal sac which could reflect postoperative change or epidural hematoma  All other components within normal limits   TROPONIN - Abnormal; Notable for the following components:    Troponin, High Sensitivity 32 (*)     All other components within normal limits   URINALYSIS WITH REFLEX TO CULTURE - Abnormal; Notable for the following components:    Specific Gravity, UA 1.046 (*)     All other components within normal limits   TEG GLOBAL HEMOSTASIS WITH LYSIS   TYPE AND SCREEN           PLAN/ TASKS OUTSTANDING     Admission      (Please note that portions of this note were completed with a voice recognition program.  Efforts were made to edit the dictations but occasionally words are mis-transcribed.)    Keith Petit MD, MD,   Attending Emergency Physician        Yonny Petit MD  03/04/25 2166

## 2025-03-05 NOTE — CARE COORDINATION
Case Management Assessment  Initial Evaluation    Date/Time of Evaluation: 3/5/2025 11:05 AM  Assessment Completed by: MELLISA BLANKENSHIP    If patient is discharged prior to next notation, then this note serves as note for discharge by case management.    Patient Name: Modesto Chan                   YOB: 1955  Diagnosis: Closed traumatic brain injury, with unknown loss of consciousness status, initial encounter ([facility]) [S06.9XAA]                   Date / Time: 3/4/2025  7:38 PM    Patient Admission Status: Inpatient   Readmission Risk (Low < 19, Mod (19-27), High > 27): Readmission Risk Score: 5.9    Current PCP: No primary care provider on file.  PCP verified by CM? (P) Yes (Dr Thomas)    Chart Reviewed: Yes      History Provided by: (P) Patient  Patient Orientation: (P) Alert and Oriented, Person, Place, Situation, Self    Patient Cognition: (P) Alert    Hospitalization in the last 30 days (Readmission):  No    If yes, Readmission Assessment in  Navigator will be completed.    Advance Directives:      Code Status: Full Code   Patient's Primary Decision Maker is: (P) Legal Next of Kin      Discharge Planning:    Patient lives with: (P) Spouse/Significant Other Type of Home: (P) House  Primary Care Giver: (P) Self  Patient Support Systems include: (P) Spouse/Significant Other, Children, Family Members   Current Financial resources: (P) Medicare  Current community resources: (P) None  Current services prior to admission: (P) Durable Medical Equipment            Current DME: (P) Walker, Shower Chair            Type of Home Care services:  (P) None    ADLS  Prior functional level: (P) Independent in ADLs/IADLs  Current functional level: (P) Independent in ADLs/IADLs    PT AM-PAC:   /24  OT AM-PAC:   /24    Family can provide assistance at DC: (P) Yes  Would you like Case Management to discuss the discharge plan with any other family members/significant others, and if so, who? (P) No  Plans to Return  to Present Housing: (P) Yes  Other Identified Issues/Barriers to RETURNING to current housing: medical condition   Potential Assistance needed at discharge: (P) N/A            Potential DME:    Patient expects to discharge to: (P) House  Plan for transportation at discharge: (P) Family    Financial    Payor: MEDICARE / Plan: MEDICARE PART A AND B / Product Type: *No Product type* /     Does insurance require precert for SNF: Yes    Potential assistance Purchasing Medications: (P) No  Meds-to-Beds request:      No Pharmacies Listed    Notes:    Factors facilitating achievement of predicted outcomes: Family support, Caregiver support, Motivated, Cooperative, Pleasant, Sense of humor, and Has needed Durable Medical Equipment at home    Barriers to discharge: Pain and Limited safety awareness    Additional Case Management Notes: Pt verified address, PCP and insurance. Lives home w wife has DME. Plans to return home w wife, has transport, denies any needs.     The Plan for Transition of Care is related to the following treatment goals of Closed traumatic brain injury, with unknown loss of consciousness status, initial encounter (HCC) [S06.9XAA]    IF APPLICABLE: The Patient and/or patient representative Modesto and his family were provided with a choice of provider and agrees with the discharge plan. Freedom of choice list with basic dialogue that supports the patient's individualized plan of care/goals and shares the quality data associated with the providers was provided to: (P) Patient   Patient Representative Name:       The Patient and/or Patient Representative Agree with the Discharge Plan? (P) Yes    MELLISA BLANKENSHIP  Case Management Department  Ph: [phone]

## 2025-03-05 NOTE — ED NOTES
The following labs were labeled with appropriate pt sticker and tubed to lab:     [] Blue     [] Lavender   [] on ice  [x] Green/yellow  [] Green/black [] on ice  [] Grey  [] on ice  [] Yellow  [] Red  [] Pink  [] Type/ Screen  [] ABG  [] VBG    [] COVID-19 swab    [] Rapid  [] PCR  [] Flu swab  [] Peds Viral Panel     [x] Urine Sample  [] Fecal Sample  [] Pelvic Cultures  [] Blood Cultures  [] X 2  [] STREP Cultures

## 2025-03-05 NOTE — PROGRESS NOTES
SPIRITUAL HEALTH - [facility]     Emergency/Trauma Note    PATIENT NAME: Modesto Chan    Shift date: 3/4/2025  Shift day: Tuesday   Shift # 2    Room # 34/34   Name: Dx Trauma [place]            Age: 70 y.o.  Gender: male          Yarsani: Yazidism   Place of Religious: Unknown    Trauma/Incident type: Adult Trauma Priority  Admit Date & Time: 3/4/2025  7:38 PM  TRAUMA NAME: David Trauma [place]    ADVANCE DIRECTIVES IN CHART?  No    NAME OF DECISION MAKER: Chika Chan [phone]    RELATIONSHIP OF DECISION MAKER TO PATIENT: Wife    PATIENT/EVENT DESCRIPTION:  Modesto Chan s a 70 y.o. male who arrived via [place] [facility] from home as a trauma priority. Pt awake, alert, and calm. Pt to be admitted to 34/34.         SPIRITUAL ASSESSMENT-INTERVENTION-OUTCOME:  No spiritual assessment give as Pt went into CT scan. Pt wife arrived in ED waiting area who confirmed husbands name and date of birth along with her name. Pt wife chika was anxious and worried.  offered ministry of presence and active listening which assured and calmed wife.      PATIENT BELONGINGS:   did not handle belongings    ANY BELONGINGS OF SIGNIFICANT VALUE NOTED:  Unknown    REGISTRATION STAFF NOTIFIED?  Yes      WHAT IS YOUR SPIRITUAL CARE PLAN FOR THIS PATIENT?:   Spiritual care available via perfect serve 24/7 if referred or requested for any spiritual or emotional needs.      03/04/25 2033   Encounter Summary   Encounter Overview/Reason Crisis   Service Provided For Patient;Family   Referral/Consult From Multi-disciplinary team   Support System Spouse;Children;Family members   Last Encounter  03/04/25   Complexity of Encounter Moderate   Begin Time 1950   End Time  2020   Total Time Calculated 30 min   Crisis   Type Trauma  (Priority)   Assessment/Intervention/Outcome   Assessment Coping;Fearful   Intervention Active listening;Nurtured Hope;Sustaining Presence/Ministry of presence   Outcome  Coping;Engaged in conversation;Expressed Gratitude   Plan and Referrals   Plan/Referrals Continue Support (comment)       Electronically signed by Felipe Cortez,Chaplain Resident, on 3/4/2025 at 8:35 PM.  [facility]  [phone]

## 2025-03-05 NOTE — PROGRESS NOTES
Facility/Department: [facility] EMERGENCY DEPARTMENT   CLINICAL BEDSIDE SWALLOW EVALUATION    NAME: Modesto Chan  : 1955  MRN: [id number]    ADMISSION DATE: 3/4/2025  ADMITTING DIAGNOSIS: has Closed traumatic brain injury, with unknown loss of consciousness status, initial encounter (HCC); Transient alteration of awareness; Acute coronary syndrome (HCC); Fall; Subdural hemorrhage (HCC); and Syncope and collapse on their problem list.    Date of Eval: 3/5/2025  Evaluating Therapist: CARLOS MANUEL LEHMAN    Current Diet level:  Current Diet : NPO  Current Liquid Diet : NPO    Primary Complaint  70-year-old male with past medical history of NPH status post  shunt 2024, hypertension presented to the emergency department after a fall at home.  He states that he has had issues recently with his gait requiring use of a rollator walker since diagnosis of his NPH.  He states he was in his kitchen making a sandwich when he fell.  He states he is not exactly sure what caused him to fall and is not sure if he lost consciousness.  He states that after the fall he remembers feeling a little bit confused.  He denies any chest pain, palpitations, shortness of breath prior to the event.  In the emergency department he was hypertensive requiring nicardipine drip.  Initial lab workup notable for mildly elevated troponins 32 -> 35.  Cardiology was consulted for elevated troponins and syncope.  CT head showed no acute findings.  CTA head and neck showed epidural density within the C-spine which could reflect postoperative change or epidural hematoma.  Neurosurgery was consulted, no neurosurgical intervention warranted.  Neurology was also consulted and ordered EEG    Pain:  0/10    Reason for Referral  Modesto Chan was referred for a bedside swallow evaluation to assess the efficiency of his swallow function, identify signs and symptoms of aspiration and make recommendations regarding safe dietary  consistencies tested.    Prognosis  Prognosis: Fair  Consulted and agree with results and recommendations: Patient;RN    Education  Patient Education: yes  Patient Education Response: Verbalizes understanding             Therapy Time  Time in: 930  Time out: 940  Total Time: 10      LUIZA STRONG M.A. CCC-SLP  3/5/2025 1:04 PM

## 2025-03-05 NOTE — CONSULTS
Stroke Neurology Consult Note  Stroke Alert @10:07 PM  Arrival at bedside @10:12 PM    Reason for Consult:  ED Stroke Consult  Requesting Physician:  ED team   Endovascular Neurosurgeon: Darion Delaney MD  Stroke Team: Caro Shipley DO   History Obtained From:  patient  Chief Complaint:  Altered mental status   Allergies:  Patient has no allergy information on record.    History of Presenting Illness     70-year-old male presenting after experiencing a fall.  History of  shunt.  Wife mentions she was in a different room and she heard a thump and went to see that her  was on the floor.  Was unclear the circumstances of why he fell however he appeared completely mentally intact afterwards and then he took a nap and when he woke up he was confused and had a fluent aphasia.  EMS were then called and on the way he appeared to have improved.  Currently he does have very mild confusion but is otherwise nonfocal neurologically, he does have hyperreflexia in the lower extremities more so on the right side with crossover.  Otherwise CT head appeared to show no hydrocephalus and CTA demonstrated some stenosis of bilateral ICA but otherwise no occlusions or any concerning signs.  X-ray also showed that the shunt is in place.    Per patient account he mentions that due to his health problems he fell but he cannot pinpoint exactly why he dropped to the ground        LKW: 3:30pm  NIHSS: 0  Modified Rolette Scale: 1  SBP: 196  Glucose: 95  CT head without contrast: normal, shunt present   TNK: not given, outside window  Endovascular: /    Review of Systems   Constitutional:  Negative for activity change, fatigue, fever and unexpected weight change.   HENT:  Negative for congestion and sore throat.    Respiratory:  Negative for cough, chest tightness and shortness of breath.    Cardiovascular:  Negative for chest pain and leg swelling.   Gastrointestinal:  Negative for abdominal distention, abdominal pain, diarrhea and  nausea.   Endocrine: Negative for polyuria.   Genitourinary:  Negative for dysuria, frequency and urgency.   Musculoskeletal:  Negative for arthralgias and back pain.   Skin:  Negative for color change and pallor.   Neurological:  Negative for dizziness, facial asymmetry, weakness, light-headedness, numbness and headaches.   Psychiatric/Behavioral:  Negative for agitation and behavioral problems.        Past Histories    No past medical history on file.  No past surgical history on file.  Social History     Socioeconomic History    Marital status:      Spouse name: Not on file    Number of children: Not on file    Years of education: Not on file    Highest education level: Not on file   Occupational History    Not on file   Tobacco Use    Smoking status: Not on file    Smokeless tobacco: Not on file   Substance and Sexual Activity    Alcohol use: Not on file    Drug use: Not on file    Sexual activity: Not on file   Other Topics Concern    Not on file   Social History Narrative    Not on file     Social Determinants of Health     Financial Resource Strain: Not on file   Food Insecurity: Not on file   Transportation Needs: Not on file   Physical Activity: Not on file   Stress: Not on file   Social Connections: Not on file   Intimate Partner Violence: Not on file   Housing Stability: Not on file     No family history on file.  Prior to Admission medications    Not on File     Current Facility-Administered Medications: niCARdipine (CARDENE) 20 mg in 0.9 % sodium chloride 200 mL solution, 2.5-15 mg/hr, IntraVENous, Continuous    Neurologic Exam   BP (!) 208/95   Pulse 72   Temp 97.4 °F (36.3 °C) (Oral)   Resp 21   Ht 1.829 m (6')   Wt 79.4 kg (175 lb)   SpO2 96%   BMI 23.73 kg/m²     Physical exam and NIH  CONSTITUTIONAL: Well developed, well nourished, alert and oriented x 3, in no acute distress. GCS 15, nontoxic. No dysarthria, no aphasia. EOMI.   HEAD: normocephalic, atraumatic   EYES: PERRLA, EOMI.        Diagnostics   Labs:    Lab Results   Component Value Date/Time    WBC 9.7 03/04/2025 07:53 PM    RBC 4.56 03/04/2025 07:53 PM    HGB 14.4 03/04/2025 07:53 PM    HCT 42.3 03/04/2025 07:53 PM     03/04/2025 07:53 PM    MCV 92.8 03/04/2025 07:53 PM    MCH 31.6 03/04/2025 07:53 PM    MCHC 34.0 03/04/2025 07:53 PM    RDW 14.3 03/04/2025 07:53 PM     BMP:    Lab Results   Component Value Date/Time     03/04/2025 07:53 PM    K 3.9 03/04/2025 07:53 PM     03/04/2025 07:53 PM    CO2 25 03/04/2025 07:53 PM    BUN 19 03/04/2025 07:53 PM    CREATININE 0.9 03/04/2025 07:53 PM    LABGLOM 58 03/04/2025 07:53 PM    GLUCOSE 95 03/04/2025 07:53 PM       Imaging:  XR SHUNT SERIES PLACEMENT (<4 VIEWS)    Result Date: 3/4/2025  Ventriculopleural shunt catheter in place.  No abrupt kink or discontinuity visualized along the course of the catheter.     CT LUMBAR SPINE BONY RECONSTRUCTION    Result Date: 3/4/2025  1. No acute lumbar spine fracture. 2. Spinal degenerative changes as described. Mild retrolisthesis at L3-L4 and L4-L5 is likely degenerative.     CT THORACIC SPINE BONY RECONSTRUCTION    Result Date: 3/4/2025  1. Mild anterior wedging and superior endplate concavity at T11 with less than 25% height loss and no dorsal retropulsion consistent with a mild age indeterminate compression fracture.  This does not have acute features. Elsewhere, no acute thoracic spine fracture is identified. 2. Early multilevel degenerative disc disease in the spine.     CT CERVICAL SPINE WO CONTRAST    Result Date: 3/4/2025  No acute abnormality of the cervical spine. Multilevel degenerative disc disease.     CT HEAD WO CONTRAST    Result Date: 3/4/2025  No acute intracranial abnormality.       Assessment & Plan       70-year-old male with a history of  shunt presenting after experiencing a fall with occipital head trauma, he is on aspirin 81 mg daily and has a  shunt which appears intact without leakage and a CT head and

## 2025-03-05 NOTE — PROCEDURES
PROCEDURE NOTE  Date: 3/5/2025   Name: Modesto Chan  YOB: 1955    Procedures            Date: 3/5/2025  Referring physician: Dr. Mckeon    Indication  Patient aged 70 y with encephalopathy. EEG done to assess for epileptiform activity.    Introduction  This routine 25-minute EEG was recorded using the [employer] 10-20 System on a [employer] workstation at 256 samples/s. Automated spike and seizure detection algorithms were applied.    Description  During the maximal alert state, a well-regulated, symmetric, and reactive 7-8 Hz posterior dominant rhythm was seen. No consistent focal slowing or interhemispheric asymmetry was noted. Stage I and stage II sleep were observed. There were no interictal epileptiform discharges or electrographic seizures.    Activations  Hyperventilation was not performed. Intermittent photic stimulation was performed and demonstrated no posterior driving response.    Impression  Abnormal awake EEG. The slowing mentioned above suggests mild non specific encephalopathy.     No epileptiform discharges were identified. Please note the absence of such activity on this record cannot conclusively rule out an epileptic disorder. If such is still clinically suspected, a repeat study with sleep deprivation and/or prolonged sampling may be helpful.    Phyllis Montiel MD  Epilepsy Board Certified.  Neurology Board Certified.    Electronically Signed

## 2025-03-05 NOTE — ED NOTES
2206: Tonja GUTIERREZ  given to Ct GUTIERREZ at [place] [facility] Telebox 051 verified on with tele room. Pt transferred to [id number] with transport. Pt has no belonging.    The following labs were labeled with appropriate pt sticker and tubed to lab:     [x] Blue     [x] Lavender   [] on ice  [x] Green/yellow  [x] Green/black [] on ice  [] Dalton  [] on ice  [x] Yellow  [x] Red  [] Pink  [] Type/ Screen  [] ABG  [] VBG    [] COVID-19 swab    [] Rapid  [] PCR  [x] Flu swab  [] Peds Viral Panel     [] Urine Sample  [] Fecal Sample  [] Pelvic Cultures  [x] Blood Cultures  [x] X 2  [] STREP Cultures

## 2025-03-05 NOTE — H&P
TRAUMA H&P/CONSULT    PATIENT NAME: Dx Trauma Xxport[place]  YOB: 1955  MEDICAL RECORD NO. [id number]   DATE: 3/5/2025      ACTIVATION   Trauma Priority      IMPRESSION AND PLAN:     SHARON   Fell in the kitchen, not sure how he fell   Has a  shunt in place   Pan CT Scan  GCS 15/15 upon arrival  Neurosurgery taken on board due to MRI/CTA head and neck findings, no neurosurgical intervention   Disposition  Sign off   Disposition per ED     If intracranial hemorrhage is present, is it a:  [] BIG 1  [] BIG 2  [] BIG 3  If chest wall injury: Rib score___    CONSULT SERVICES    Other: Neurosurgery       HISTORY:     Chief Complaint:  \"FFSH, confused about the incidence\"    GENERAL DATA  Patient information was obtained from patient and EMS personnel.  History/Exam limitations: none.  Injury Date: 3/4/2025   Approximate Injury Time: 4 PM         Transport mode: Ambulance  Referring Hospital:      SETTING OF TRAUMATIC EVENT   Location : Home  Specific Details of Location: Living Room    MECHANISM OF INJURY    Fall From standing      HISTORY:     Dx Trauma [place] is a male that presented to the Emergency Department following FFSH, according to wife, he was in the kitchen making sandwich, when she came he was on the floor, she is not how he fell and he cannot recall also how he fell. He denies nausea, chest pain, SOB, focal weakness.     Traumatic loss of Consciousness: Unknown    Total Fluids Given Prior To Arrival  mL    MEDICATIONS:   []  None     []  Information not available due to exam limitations documented above    Prior to Admission medications    Not on File       ALLERGIES:   []  None    []   Information not available due to exam limitations documented above     Patient has no allergy information on record.    PAST MEDICAL/SURGICAL HISTORY: []  None   []   Information not available due to exam limitations documented above      has no past medical history on file.  has no past surgical history on  Abnormal; Notable for the following components:    Specific Gravity, UA 1.046 (*)     All other components within normal limits   TEG GLOBAL HEMOSTASIS WITH LYSIS   TYPE AND SCREEN         Ryan Fulton MD  3/4/25, 3:00 AM

## 2025-03-05 NOTE — ED NOTES
Writer perfect served neurology team regarding patients MRI results   Will continue with plan of care

## 2025-03-05 NOTE — ED PROVIDER NOTES
[facility] EMERGENCY DEPARTMENT  Emergency Department Encounter  Emergency Medicine Resident     Pt Name:Dx Trauma Xxport[place]  MRN: [id number]  Birthdate 1955  Date of evaluation: 3/4/25  PCP:  No primary care provider on file.  Note Started: 8:02 PM EST      CHIEF COMPLAINT       Chief Complaint   Patient presents with    Fall       HISTORY OF PRESENT ILLNESS  (Location/Symptom, Timing/Onset, Context/Setting, Quality, Duration, Modifying Factors, Severity.)      David Lehman is a 70 y.o. male who presents with history of  shunt presents for evaluation of altered mental status status post fall that occurred today.  Patient states he was trying to feed his dog when he lost his balance reportedly hit his head.  Patient altered initially for EMS.  Patient more awake upon arrival to the emergency department.  No known anticoagulation.    Later history taken by family reports patient had a distinct episode of alteration in mentation, aphasia which is what concerned him and why they called EMS.    PAST MEDICAL / SURGICAL / SOCIAL / FAMILY HISTORY      has no past medical history on file.       has no past surgical history on file.      Social History     Socioeconomic History    Marital status:      Spouse name: Not on file    Number of children: Not on file    Years of education: Not on file    Highest education level: Not on file   Occupational History    Not on file   Tobacco Use    Smoking status: Not on file    Smokeless tobacco: Not on file   Substance and Sexual Activity    Alcohol use: Not on file    Drug use: Not on file    Sexual activity: Not on file   Other Topics Concern    Not on file   Social History Narrative    Not on file     Social Determinants of Health     Financial Resource Strain: Not on file   Food Insecurity: Not on file   Transportation Needs: Not on file   Physical Activity: Not on file   Stress: Not on file   Social Connections: Not on file   Intimate Partner  trauma team.  Patient will require admission [TD]      ED Course User Index  [TD] Patience Simon DO       CONSULTS:  IP CONSULT TO STROKE TEAM  IP CONSULT TO NEUROSURGERY  IP CONSULT TO NEUROSURGERY      FINAL IMPRESSION      1. Transient alteration of awareness    2. Fall, initial encounter    3. Subdural hemorrhage (HCC)          DISPOSITION / PLAN     DISPOSITION Decision To Admit 03/04/2025 10:24:25 PM   DISPOSITION CONDITION Stable           PATIENT REFERRED TO:  No follow-up provider specified.    DISCHARGE MEDICATIONS:  New Prescriptions    No medications on file       Patience Simon DO  Emergency Medicine Resident    (Please note that portions of this note were completed with a voice recognition program.  Efforts were made to edit the dictations but occasionally words are mis-transcribed.)

## 2025-03-05 NOTE — PROGRESS NOTES
Facility/Department: [facility] EMERGENCY DEPARTMENT  Initial Speech/Language/Cognitive Assessment    NAME: Modesto Chan  : 1955   MRN: [id number]  ADMISSION DATE: 3/4/2025  ADMITTING DIAGNOSIS: has Closed traumatic brain injury, with unknown loss of consciousness status, initial encounter (HCC); Transient alteration of awareness; Acute coronary syndrome (HCC); Fall; Subdural hemorrhage (HCC); and Syncope and collapse on their problem list.    Date of Eval: 3/5/2025   Evaluating Therapist: CARLOS MANUEL LEHMAN    Primary Complaint:   70-year-old male with past medical history of NPH status post  shunt 2024, hypertension presented to the emergency department after a fall at home.  He states that he has had issues recently with his gait requiring use of a rollator walker since diagnosis of his NPH.  He states he was in his kitchen making a sandwich when he fell.  He states he is not exactly sure what caused him to fall and is not sure if he lost consciousness.  He states that after the fall he remembers feeling a little bit confused.  He denies any chest pain, palpitations, shortness of breath prior to the event.  In the emergency department he was hypertensive requiring nicardipine drip.  Initial lab workup notable for mildly elevated troponins 32 -> 35.  Cardiology was consulted for elevated troponins and syncope.  CT head showed no acute findings.  CTA head and neck showed epidural density within the C-spine which could reflect postoperative change or epidural hematoma.  Neurosurgery was consulted, no neurosurgical intervention warranted.  Neurology was also consulted and ordered EEG    Pain:  0/10    Vision/ Hearing  Vision  Vision: Within Functional Limits  Hearing  Hearing: Within functional limits    Assessment:  Pt presents with mild cognitive deficits characterized by difficulties with word associations, verbal sequencing, short term recall of 3 units, abstract and deductive  Reasoning  Abstract Reasoning: Exceptions to WFL  Divergent Thinking: Mild (+7)  Safety/Judgment  Safety/Judgment: Exceptions to WFL  Insight: Mild   (2/3)  Flexibility of Thought: WFL    Prognosis:  Speech Therapy Prognosis  Prognosis: Fair  Individuals consulted  Consulted and agree with results and recommendations: Patient;RN    Education:  Patient Education: yes  Patient Education Response: Verbalizes understanding          Therapy Time:   Individual Concurrent Group Co-treatment   Time In  930         Time Out  940         Minutes  10                 Electronically signed by LUIZA STRONG M.A. CCC-SLP on 3/5/2025 at 12:48 PM

## 2025-03-05 NOTE — H&P
Coronavirus 229E PCR Not Detected Not Detected    Coronavirus HKU1 PCR Not Detected Not Detected    Coronavirus NL63 PCR Not Detected Not Detected    Coronavirus OC43 PCR Not Detected Not Detected    SARS-CoV-2, PCR Not Detected Not Detected    Human Metapneumovirus PCR Not Detected Not Detected    Rhino/Enterovirus PCR Not Detected Not Detected    Influenza A by PCR Not Detected Not Detected    Influenza B by PCR Not Detected Not Detected    Parainfluenza 1 PCR Not Detected Not Detected    Parainfluenza 2 PCR Not Detected Not Detected    Parainfluenza 3 PCR Not Detected Not Detected    Parainfluenza 4 PCR Not Detected Not Detected    Resp Syncytial Virus PCR Not Detected Not Detected    Bordetella parapertussis by PCR Not Detected Not Detected    B Pertussis by PCR Not Detected Not Detected    Chlamydia pneumoniae By PCR Not Detected Not Detected    Mycoplasma pneumo by PCR Not Detected Not Detected       Imaging/Diagnostics:  MRI CERVICAL SPINE W WO CONTRAST    Result Date: 3/5/2025  1. Evidence of epidural or subdural hemorrhage surrounding the cervical thecal sac starting from the C1 level and extended to lower most limit of the sagittal images there is down to T3 level. 2. Evidence of previous total laminectomy from C3 to C7 level. 3. Evidence of previous posterior spinal fusion with bilateral posterolateral rods, interconnecting bilateral transpedicular screws at C7-T1 level. 4. No definite cord compression. 5. Multilevel degenerative disc disease and facet osteoarthritis as described in detail above. 6. No abnormal enhancement of the cervical spine. 7. Follow-up MRI of thoracic spine and lumbar spine, recommended.     MRI LIMITED BRAIN    Result Date: 3/5/2025  1. Limited MRI of the brain due to prominent ferromagnetic artifacts arising from the right parietal ventriculoperitoneal shunt tube and from the external components of the ventriculoperitoneal shunt. 2. No evidence of acute infarction, acute ischemic  process or restricted diffusion in the visualized brain parenchyma. 3. Diffuse dural thickening with T2 FLAIR hyperintense signals around both cerebral hemispheres and along the falx cerebri. These findings are likely to represent chronic/subacute subdural hematoma around both cerebral hemispheres. Possible subdural hematoma over the tentorium cerebelli. 4. No significant midline shift. 5. No intraventricular hemorrhage. 6. Minimal chronic microvascular ischemic/aging changes in the periventricular and central white matter as well as in the umberto of brainstem. 7. Evidence of epidural or subdural hematoma surrounding the cervical thecal sac, in visualized upper most portion of cervical spine.     XR CHEST PORTABLE    Result Date: 3/5/2025  There is no evidence of acute chest disease.     CTA HEAD NECK W CONTRAST    Result Date: 3/4/2025  No acute arterial injury detected in the head or neck. Epidural density within the upper C-spine, resulting in severe narrowing of the thecal sac which could reflect postoperative change or epidural hematoma given the history of recent trauma. Recommend obtaining and submitting previous exams for comparison. MRI with and without contrast may also be helpful in further characterization. The findings were sent to the Radiology Results Communication [facility] at 11:05 pm on 3/4/2025 to be communicated to a licensed caregiver.     XR SKULL (<4 VIEWS)    Result Date: 3/4/2025   shunt valve seen.     XR SHUNT SERIES PLACEMENT (<4 VIEWS)    Result Date: 3/4/2025  Ventriculopleural shunt catheter in place.  No abrupt kink or discontinuity visualized along the course of the catheter.     CT LUMBAR SPINE BONY RECONSTRUCTION    Result Date: 3/4/2025  1. No acute lumbar spine fracture. 2. Spinal degenerative changes as described. Mild retrolisthesis at L3-L4 and L4-L5 is likely degenerative.     CT THORACIC SPINE BONY RECONSTRUCTION    Result Date: 3/4/2025  1. Mild anterior wedging and superior  severity of signs and symptoms as outlined, requirement for current medical therapies and most importantly because of direct risk to patient if care not provided in a hospital setting.  Expected length of stay > 48 hours.    Festus Holbrook DO  3/5/2025  8:01 AM    Copy sent to Dr. Boyer primary care provider on file.

## 2025-03-05 NOTE — PROGRESS NOTES
Trauma Tertiary Survey    Admit Date: 3/4/2025  Hospital day 0      Subjective:     Patient seen and examined at bedside for tertiary exam. Denies any pain. GCS 15.    Objective:   PHYSICAL EXAM:   Physical Exam  Vitals and nursing note reviewed.   Constitutional:       General: He is not in acute distress.     Appearance: Normal appearance. He is not ill-appearing.   HENT:      Head: Normocephalic and atraumatic.      Nose: Nose normal.      Mouth/Throat:      Mouth: Mucous membranes are moist.   Eyes:      Extraocular Movements: Extraocular movements intact.   Cardiovascular:      Rate and Rhythm: Normal rate and regular rhythm.      Pulses: Normal pulses.   Pulmonary:      Effort: Pulmonary effort is normal. No respiratory distress.   Abdominal:      General: There is no distension.      Palpations: Abdomen is soft.      Tenderness: There is no abdominal tenderness.   Musculoskeletal:      Cervical back: No bony tenderness.      Thoracic back: No bony tenderness.      Lumbar back: No bony tenderness.   Skin:     General: Skin is warm and dry.      Comments: Surgical scar to c-spine   Neurological:      General: No focal deficit present.      Mental Status: He is alert and oriented to person, place, and time.      GCS: GCS eye subscore is 4. GCS verbal subscore is 5. GCS motor subscore is 6.           Spine:     Spine Tenderness ROM   Cervical 0 /10 Normal   Thoracic 0 /10 Normal   Lumbar 0 /10 Normal     Musculoskeletal    Joint Tenderness Swelling ROM   Right shoulder absent absent normal   Left shoulder absent absent normal   Right elbow absent absent normal   Left elbow absent absent normal   Right wrist absent absent normal   Left wrist absent absent normal   Right hand grasp absent absent normal   Left hand grasp absent absent normal   Right hip absent absent normal   Left hip absent absent normal   Right knee absent absent normal   Left knee absent absent normal   Right ankle absent absent normal   Left  changes in the umberto of brainstem. On the noncontrast CT scan of head on 03/04/2025 at 8:02 p.m., there are only mildly prominent hypodensity in the extra-axial space around both cerebral hemispheres.  Therefore the suspected subdural hematoma is likely to be chronic subdural hematoma around both cerebral hemispheres, as described above. ORBITS: The visualized portion of the orbits demonstrate no acute abnormality. SINUSES: The visualized paranasal sinuses and mastoid air cells demonstrate no acute abnormality. BONES/SOFT TISSUES: The bone marrow signal intensity appears normal. The soft tissues demonstrate no acute abnormality. Evidence of extra-axial T2 FLAIR hyperintense signals, suggestive of epidural or subdural hemorrhage surrounding the thecal sac at the level of the foramen magnum and more inferiorly.  These findings have been evaluated on MRI of cervical spine.     1. Limited MRI of the brain due to prominent ferromagnetic artifacts arising from the right parietal ventriculoperitoneal shunt tube and from the external components of the ventriculoperitoneal shunt. 2. No evidence of acute infarction, acute ischemic process or restricted diffusion in the visualized brain parenchyma. 3. Diffuse dural thickening with T2 FLAIR hyperintense signals around both cerebral hemispheres and along the falx cerebri. These findings are likely to represent chronic/subacute subdural hematoma around both cerebral hemispheres. Possible subdural hematoma over the tentorium cerebelli. 4. No significant midline shift. 5. No intraventricular hemorrhage. 6. Minimal chronic microvascular ischemic/aging changes in the periventricular and central white matter as well as in the umberto of brainstem. 7. Evidence of epidural or subdural hematoma surrounding the cervical thecal sac, in visualized upper most portion of cervical spine.     XR CHEST PORTABLE    Result Date: 3/5/2025  EXAMINATION: ONE XRAY VIEW OF THE CHEST 3/5/2025 5:18 am      Negative tertiary exam

## 2025-03-05 NOTE — ED NOTES
ED to inpatient nurses report      Chief Complaint:  Chief Complaint   Patient presents with    Fall     Present to ED from: Home    MOA:     LOC: alert and orientated to name, place, date  Mobility: Requires assistance * 1  Oxygen Baseline: RA    Current needs required: RA   Pending ED orders: na  Present condition: resting in ed cot, Cardene gtt     Why did the patient come to the ED? Pt arriving to ed via ems  Pt had several falls and episodes near syncope. Prior pt had forgotten what they were doing, forgot their name and was altered for EMS  Upon arrival pt was able to answer all questions appropriately but would go in and out mentation   Hx of r. Side vascular shunt with recent adjustment  Denies injury or blood thinners  What is the plan? Echo, eeg, cardene gtt, strict bp control for intercranial hemorrhage, neuro and neurosurg follow up  Any procedures or intervention occur? Line, labs, imaging, EKG, Cardene gtt,     Mental Status:       Psych Assessment:      Vital signs   Vitals:    03/05/25 0501 03/05/25 0521 03/05/25 0553 03/05/25 0616   BP: (!) 154/84  (!) 154/84 133/80   Pulse: 69 74  68   Resp:  13  16   Temp:       TempSrc:       SpO2: 93% 96%  94%   Weight:       Height:            Vitals:  Patient Vitals for the past 24 hrs:   BP Temp Temp src Pulse Resp SpO2 Height Weight   03/05/25 0616 133/80 -- -- 68 16 94 % -- --   03/05/25 0553 (!) 154/84 -- -- -- -- -- -- --   03/05/25 0521 -- -- -- 74 13 96 % -- --   03/05/25 0501 (!) 154/84 -- -- 69 -- 93 % -- --   03/05/25 0432 (!) 149/90 -- -- 73 10 94 % -- --   03/05/25 0300 139/70 -- -- 69 19 93 % -- --   03/05/25 0245 (!) 123/58 -- -- 70 14 92 % -- --   03/05/25 0230 126/62 -- -- 71 19 92 % -- --   03/05/25 0215 (!) 127/55 -- -- 69 15 91 % -- --   03/05/25 0200 132/64 -- -- 68 14 92 % -- --   03/05/25 0145 (!) 146/73 -- -- 65 12 94 % -- --   03/04/25 2251 115/88 -- -- 82 19 95 % -- --   03/04/25 2234 (!) 144/105 -- -- 78 20 94 % -- --   03/04/25 2214  consult [facility] for admission given elevated troponins for cardiology evaluation, weaning and waxing mentation and PT OT evaluation [NS]   2813 Reviewed patient with [facility] who states that if he has waxing and waning mentation with concerns for altered mental status post fall that this is concussive and needs to go to trauma.  Reviewed this again with trauma who were staying in his car no traumatic injuries and have signed off.  Once again reviewed with [facility] recommendations and the trauma is not following and have signed off.  Stroke team has evaluated patient and will follow up from a neurological perspective.  They will run this by their attending. [NS]      ED Course User Index  [NS] Gena Vega MD  [TD] Patience Simon DO              Skin Assessment:        Pain Score:         SOCIAL HISTORY       Social History     Socioeconomic History    Marital status:        FAMILY HISTORY     No family history on file.    ALLERGIES     Patient has no allergy information on record.    CURRENT MEDICATIONS       Previous Medications    No medications on file     Orders Placed This Encounter   Medications    iopamidol (ISOVUE-370) 76 % injection 130 mL    DISCONTD: niCARdipine (CARDENE) 20 mg in 0.9 % sodium chloride 200 mL solution     Order Specific Question:   Titrate Infusion?     Answer:   Yes     Order Specific Question:   Initial Infusion Rate:     Answer:   5 mg/hr     Order Specific Question:   Goal of Therapy is:     Answer:   SBP less than 160 mmHg     Order Specific Question:   Contact Provider if:     Answer:   Patient is receiving the maximum dose and is not achieving the goal of therapy    iopamidol (ISOVUE-370) 76 % injection 75 mL    gadoteridol (PROHANCE) injection 15 mL    niCARdipine (CARDENE) 20 mg in 0.9 % sodium chloride 200 mL solution     Order Specific Question:   Titrate Infusion?     Answer:   Yes     Order Specific Question:   Initial Infusion Rate:     Answer:   5 mg/hr      Order Specific Question:   Goal of Therapy is:     Answer:   SBP less than 140 mmHg     Order Specific Question:   Contact Provider if:     Answer:   Patient is receiving the maximum dose and is not achieving the goal of therapy    labetalol (NORMODYNE;TRANDATE) injection 20 mg    hydrALAZINE (APRESOLINE) injection 10 mg       SURGICAL HISTORY     No past surgical history on file.    PAST MEDICAL HISTORY     No past medical history on file.    Labs:  Labs Reviewed   TRAUMA PANEL - Abnormal; Notable for the following components:       Result Value    Est, Glom Filt Rate 58 (*)     pCO2, Kelvin 37.0 (*)     HCO3, Venous 21.6 (*)     Negative Base Excess, Kelvin 2.9 (*)     O2 Sat, Kelvin 58.6 (*)     All other components within normal limits   TROPONIN - Abnormal; Notable for the following components:    Troponin, High Sensitivity 35 (*)     All other components within normal limits   TROPONIN - Abnormal; Notable for the following components:    Troponin, High Sensitivity 32 (*)     All other components within normal limits   URINALYSIS WITH REFLEX TO CULTURE - Abnormal; Notable for the following components:    Specific Gravity, UA 1.046 (*)     All other components within normal limits   BLOOD GAS, VENOUS - Abnormal; Notable for the following components:    pH, Kelvin 7.437 (*)     pCO2, Kelvin 35.1 (*)     PO2, Kelvin 78.8 (*)     HCO3, Venous 23.1 (*)     O2 Sat, Kelvin 96.1 (*)     All other components within normal limits   CULTURE, BLOOD 1   CULTURE, BLOOD 1   RESPIRATORY PANEL, MOLECULAR, WITH COVID-19   TEG GLOBAL HEMOSTASIS WITH LYSIS   HEPATIC FUNCTION PANEL   LIPID PANEL   HEMOGLOBIN A1C   TSH REFLEX TO FT4   VITAMIN B12 & FOLATE   URINE DRUG SCREEN   TYPE AND SCREEN       Electronically signed by Juan Daniel Bailey RN on 3/5/2025 at 6:32 AM

## 2025-03-05 NOTE — PROGRESS NOTES
[facility]     Emergency/Trauma Note    PATIENT NAME: David Trauma Dominik    Shift date: 03/04/2025  Shift day: Tuesday   Shift # 2    Room # 34/34   Name: Modesto Chan          Age: 70 y.o.  Gender: male          Jewish: Baptist   Place of Christianity:     Trauma/Incident type: Stroke Alert  Admit Date & Time: 3/4/2025  7:38 PM  TRAUMA NAME: dominik (from earlier today)    ADVANCE DIRECTIVES IN CHART?  No    NAME OF DECISION MAKER: Unknown    RELATIONSHIP OF DECISION MAKER TO PATIENT: Unknown    PATIENT/EVENT DESCRIPTION:  David Trauma Dominik is a 70 y.o. male who arrived at [facility].  responded to Stroke Alert to ER 34. Pt under trauma name which was from earlier this evening. Patient, upon returning, was not in room, spouse and children were present. Family appeared coping and hopeful. Pt to be admitted to 34/34.         SPIRITUAL ASSESSMENT-INTERVENTION-OUTCOME:   provided a supportive presence through active listening and words of affirmation.     PATIENT BELONGINGS:  Writer did not handle patient belongings    ANY BELONGINGS OF SIGNIFICANT VALUE NOTED:  N/A    REGISTRATION STAFF NOTIFIED?  Yes      WHAT IS YOUR SPIRITUAL CARE PLAN FOR THIS PATIENT?:   Chaplains will remain available to offer spiritual and emotional support as needed.    Electronically signed by Chaplain Pito, on 3/4/2025 at 10:36 PM.  [facility]  [phone]

## 2025-03-05 NOTE — ED NOTES
Patient provided urinal, and warm blanket at this time per request   There were no problems obtaining orthostatic bp and pulse, patient denies any dizziness   Will continue with plan of care

## 2025-03-05 NOTE — ED PROVIDER NOTES
Note Started: 10:37 PM GABBIE         [facility]     Emergency Department     Faculty Attestation    I performed a history and physical examination of the patient and discussed management with the resident. I reviewed the resident’s note and agree with the documented findings and plan of care. Any areas of disagreement are noted on the chart. I was personally present for the key portions of any procedures. I have documented in the chart those procedures where I was not present during the key portions. I have reviewed the emergency nurses triage note. I agree with the chief complaint, past medical history, past surgical history, allergies, medications, social and family history as documented unless otherwise noted below.        For Physician Assistant/ Nurse Practitioner cases/documentation I have personally evaluated this patient and have completed at least one if not all key elements of the E/M (history, physical exam, and MDM). Additional findings are as noted.  I have personally seen and evaluated the patient.  I find the patient's history and physical exam are consistent with the NP/PA documentation.  I agree with the care provided, treatment rendered, disposition and follow-up plan.    70-year-old male with history of  shunt presenting after falls at home.  Per EMS, patient was initially altered, not answering questions appropriately.  Became more alert/aware in route.  Not on blood thinners.  Does not remember falling.    Exam:  General : Laying on the bed, awake, alert, and in no acute distress  CV : normal rate and regular rhythm  Lungs : Breathing comfortably on room air with no tachypnea, hypoxia, or increased work of breathing  Neuro: Awake, alert, initially answering questions appropriately but has intermittent periods of altered mental status where he is staring off into space and not responding appropriately.  During these events, no focal motor deficits or facial droop.  Speech  does seem affected    DDx: Shunt malfunction, TIA/CVA, intracranial bleed    Plan:  Primary and secondary survey completed with trauma team at bedside  Labs and imaging per trauma    Imaging shows no acute traumatic injury other than a thoracic superior endplate concavity at T11, age-indeterminate compression fracture.  Patient is still having episodes of transient altered mental status.  He is significantly hypertensive.  Will treat, concern for hypertensive urgency, and involve stroke team for TIA workup.  Anticipate admission      Medical Decision Making  Amount and/or Complexity of Data Reviewed  Labs: ordered. Decision-making details documented in ED Course.  Radiology: ordered.  ECG/medicine tests: ordered.    Risk  Prescription drug management.  Decision regarding hospitalization.            Saba Harris MD   Attending Emergency Physician    (Please note that portions of this note were completed with a voice recognition program. Efforts were made to edit the dictations but occasionally words are mis-transcribed.)            Saba Harris MD  03/04/25 9193

## 2025-03-06 PROBLEM — R79.89 ELEVATED TROPONIN: Status: ACTIVE | Noted: 2025-03-06

## 2025-03-06 PROBLEM — F07.81 POST CONCUSSIVE ENCEPHALOPATHY: Status: ACTIVE | Noted: 2025-03-06

## 2025-03-06 LAB
EKG ATRIAL RATE: 74 BPM
EKG ATRIAL RATE: 74 BPM
EKG P AXIS: 74 DEGREES
EKG P AXIS: 74 DEGREES
EKG P-R INTERVAL: 216 MS
EKG P-R INTERVAL: 216 MS
EKG Q-T INTERVAL: 398 MS
EKG Q-T INTERVAL: 398 MS
EKG QRS DURATION: 86 MS
EKG QRS DURATION: 86 MS
EKG QTC CALCULATION (BAZETT): 441 MS
EKG QTC CALCULATION (BAZETT): 441 MS
EKG R AXIS: 73 DEGREES
EKG R AXIS: 73 DEGREES
EKG T AXIS: 56 DEGREES
EKG T AXIS: 56 DEGREES
EKG VENTRICULAR RATE: 74 BPM
EKG VENTRICULAR RATE: 74 BPM
MICROORGANISM SPEC CULT: ABNORMAL
SERVICE CMNT-IMP: ABNORMAL
SERVICE CMNT-IMP: ABNORMAL
SPECIMEN DESCRIPTION: ABNORMAL
SPECIMEN DESCRIPTION: ABNORMAL

## 2025-03-06 PROCEDURE — 93010 ELECTROCARDIOGRAM REPORT: CPT | Performed by: INTERNAL MEDICINE

## 2025-03-06 PROCEDURE — 97530 THERAPEUTIC ACTIVITIES: CPT

## 2025-03-06 PROCEDURE — 97129 THER IVNTJ 1ST 15 MIN: CPT

## 2025-03-06 PROCEDURE — 97162 PT EVAL MOD COMPLEX 30 MIN: CPT

## 2025-03-06 PROCEDURE — 6370000000 HC RX 637 (ALT 250 FOR IP): Performed by: STUDENT IN AN ORGANIZED HEALTH CARE EDUCATION/TRAINING PROGRAM

## 2025-03-06 PROCEDURE — 97130 THER IVNTJ EA ADDL 15 MIN: CPT

## 2025-03-06 PROCEDURE — 2500000003 HC RX 250 WO HCPCS: Performed by: NURSE PRACTITIONER

## 2025-03-06 PROCEDURE — 97116 GAIT TRAINING THERAPY: CPT

## 2025-03-06 PROCEDURE — 1200000000 HC SEMI PRIVATE

## 2025-03-06 PROCEDURE — 97535 SELF CARE MNGMENT TRAINING: CPT

## 2025-03-06 PROCEDURE — 97166 OT EVAL MOD COMPLEX 45 MIN: CPT

## 2025-03-06 PROCEDURE — 97110 THERAPEUTIC EXERCISES: CPT

## 2025-03-06 PROCEDURE — 99232 SBSQ HOSP IP/OBS MODERATE 35: CPT | Performed by: STUDENT IN AN ORGANIZED HEALTH CARE EDUCATION/TRAINING PROGRAM

## 2025-03-06 RX ADMIN — GABAPENTIN 300 MG: 300 CAPSULE ORAL at 12:39

## 2025-03-06 RX ADMIN — PANTOPRAZOLE SODIUM 40 MG: 40 TABLET, DELAYED RELEASE ORAL at 20:17

## 2025-03-06 RX ADMIN — SODIUM CHLORIDE, PRESERVATIVE FREE 10 ML: 5 INJECTION INTRAVENOUS at 20:18

## 2025-03-06 RX ADMIN — GABAPENTIN 300 MG: 300 CAPSULE ORAL at 07:15

## 2025-03-06 RX ADMIN — AMLODIPINE BESYLATE 5 MG: 5 TABLET ORAL at 07:15

## 2025-03-06 RX ADMIN — OXYBUTYNIN CHLORIDE 10 MG: 10 TABLET, EXTENDED RELEASE ORAL at 07:15

## 2025-03-06 RX ADMIN — PANTOPRAZOLE SODIUM 40 MG: 40 TABLET, DELAYED RELEASE ORAL at 07:16

## 2025-03-06 RX ADMIN — GABAPENTIN 300 MG: 300 CAPSULE ORAL at 20:17

## 2025-03-06 RX ADMIN — SODIUM CHLORIDE, PRESERVATIVE FREE 10 ML: 5 INJECTION INTRAVENOUS at 07:16

## 2025-03-06 NOTE — PLAN OF CARE
Problem: Safety - Adult  Goal: Free from fall injury  3/6/2025 0901 by Lyla Bernstein RN  Outcome: Progressing  Flowsheets (Taken 3/6/2025 0858)  Free From Fall Injury: Instruct family/caregiver on patient safety  3/6/2025 0617 by Ilsa Tompkins RN  Outcome: Progressing     Problem: Discharge Planning  Goal: Discharge to home or other facility with appropriate resources  3/6/2025 0901 by Lyla Bernstein RN  Outcome: Progressing  Flowsheets (Taken 3/6/2025 0719)  Discharge to home or other facility with appropriate resources: Identify barriers to discharge with patient and caregiver  3/6/2025 0617 by Ilsa Tompkins RN  Outcome: Progressing     Problem: Skin/Tissue Integrity  Goal: Skin integrity remains intact  Description: 1.  Monitor for areas of redness and/or skin breakdown  2.  Assess vascular access sites hourly  3.  Every 4-6 hours minimum:  Change oxygen saturation probe site  4.  Every 4-6 hours:  If on nasal continuous positive airway pressure, respiratory therapy assess nares and determine need for appliance change or resting period  Outcome: Progressing

## 2025-03-06 NOTE — PROGRESS NOTES
Occupational Therapy  Occupational Therapy Initial Evaluation  Facility/Department: [address] STEPDOWN   Patient Name: Modesto Chan        MRN: [id number]    : 1955    Date of Service: 3/6/2025    Chief Complaint   Patient presents with    Fall     Past Medical History:  has no past medical history on file.  Past Surgical History:  has no past surgical history on file.    Discharge Recommendations  Discharge Recommendations: Continue to assess pending progress  OT Equipment Recommendations  Equipment Needed: No  Other: Has equipment    Assessment  Assessment: Patient is normaly independent with functional mobility, personal ADLs and household tasks. At  this time patient requires up to min  assist with functional mobility and up to min  assist with ADLs. Patient would benefit from continued therapy during acute stay. Patient is safe to return to prior living arrangement with 24/7 assistance to safely perform personal ADLs and mobility.  Decision Making: Medium Complexity  REQUIRES OT FOLLOW-UP: Yes  Activity Tolerance  Activity Tolerance: Patient Tolerated treatment well;Patient limited by fatigue  Safety Devices  Type of Devices: Call light within reach;Gait belt;Left in chair;Chair alarm in place  Restraints  Restraints Initially in Place: No    AM-PAC  AM-[facility] Daily Activity - Inpatient   How much help is needed for putting on and taking off regular lower body clothing?: A Little  How much help is needed for bathing (which includes washing, rinsing, drying)?: A Little  How much help is needed for toileting (which includes using toilet, bedpan, or urinal)?: A Little  How much help is needed for putting on and taking off regular upper body clothing?: A Little  How much help is needed for taking care of personal grooming?: None  How much help for eating meals?: None  AM-PAC Inpatient Daily Activity Raw Score: 20  AM-PAC Inpatient ADL T-Scale Score : 42.03  ADL Inpatient CMS 0-100% Score: 38.32  ADL Inpatient  CMS G-Code Modifier : CJ    Restrictions/Precautions  Restrictions/Precautions  Restrictions/Precautions: Fall Risk  Required Braces or Orthoses?: No       Subjective  General  Patient assessed for rehabilitation services?: Yes  Additional Pertinent Hx: Admit s/p fall at home, stroke alert. No acute brain abnormality. Hx frequent falls prior to  shint and cervical sx, NPH s/p  shunt.  Subjective  Subjective: Patient agreeable to OT Evaluation/Treatment  General Comment  Comments: RN cleared for OT Evaluation/Treatement  Pain  Pre-Pain: 0  Post-Pain: 0       Home Setup/Prior Level of Function  Social/Functional History  Lives With: Spouse  Type of Home: House  Home Layout: Two level;Able to Live on Main level with bedroom/bathroom  Home Access: Stairs to enter with rails  Entrance Stairs - Number of Steps: 4  Entrance Stairs - Rails: Both  Bathroom Shower/Tub: Tub/Shower unit  Home Equipment: Rollator  Has the patient had two or more falls in the past year or any fall with injury in the past year?: Yes  Prior Level of Assist for ADLs: Independent  Prior Level of Assist for Homemaking: Independent  Homemaking Responsibilities: No (spouse completes)  Prior Level of Assist for Ambulation: Independent household ambulator, with or without device  Prior Level of Assist for Transfers: Independent  Active : Yes  Mode of Transportation: Car (spouse also drives)  Occupation: Retired  Type of Occupation: vending  Additional Comments: spouse is retired and able to assist    Vision/Hearing  Vision  Vision: Impaired  Vision Exceptions: Wears glasses for reading       BUE Assessment  Gross Assessment  AROM: Generally decreased, functional  Strength: Generally decreased, functional  Coordination: Generally decreased, functional  Hand Dominance: Right     Objective  Orientation  Overall Orientation Status: Within Normal Limits  Orientation Level: Oriented X4  Cognition  Overall Cognitive Status:

## 2025-03-06 NOTE — PROGRESS NOTES
[facility]  Office: [phone]  Heber Seo DO, Angel Luis Weems DO, Emanuel Calderón DO, Tim Ricks DO, Yasmin Kyle MD, Romina Rebollar MD, Michelle Moreno MD, Jenifer Khanna MD,  Rober Mir MD, Stephon Whittington MD, Geneva Marroquin MD,  Phyllis Bruce DO, Tania Hogan MD, Jaun Urena MD, Arsenio Seo DO, Sun Briones MD,  Rodolfo Colon DO, Jaz Chawla MD, Eleonora Molina MD, Cassie Emmanuel MD, Chris Tavares MD,  Ambrose Gómez MD, Raul Marinelli MD, Suze Cline MD, Flora Barragan MD, Honorio Tinoco MD, Loren Najera MD, Festus Holbrook DO, Enrique Mcpherson MD, Phyllis Rivera MD, Mohsin Reza, MD, Shirley Waterhouse, CNP,  Maru Philippe CNP, Festus Atkinson, CNP,  Alona Paredes, DNP, Aylin Aguiar, CNP, Leann Qureshi, CNP, Emily Aguilar, CNP, Luisa Loving CNP, Carolynn Biggs, PA-C, Jael Ramirez, CNP, Freda Andino, CNP,  Maria Campo, CNP, Padmini Venegas, CNP, Myrtle Benjamin, CNP,  Scarlet Duong, CNS, Chiqui Hurtado CNP, Delia Marmolejo CNP,   Roseline Hair, CNP         [facility]   IN-PATIENT SERVICE   [facility]    Progress Note    3/6/2025    7:35 AM    Name:   Modesto Chan  MRN:     [id number]     Acct:      [id number]   Room:   0133/0133-01   Day:  1  Admit Date:  3/4/2025  7:38 PM    PCP:   No primary care provider on file.  Code Status:  Full Code    Subjective:     C/C:   Chief Complaint   Patient presents with    Fall     Interval History Status: improved.     Vitals reviewed, afebrile and hemodynamically stable.  Saturating well on room air.  Labs reviewed.  Overnight patient had no significant events.    On examination patient resting comfortably in chair at bedside.  Discussed in detail with patient's wife and patient.  Patient's wife states that patient was making a sandwich at home over-the-counter followed by a loud thud.  Went to see and found her  on the ground on his back.  Helped him up and then  patient had lunch and took a nap.  She states following the nap he appeared confused not recognizing his child or wife and speaking \" gibberish\".  He was evaluated by neurosurgery and his  shunt was found to be at 0.5 however supposed to be set at 1.5.  MRI cervical spine demonstrated subdural hematoma at the superior aspect of the cervical spine with no abnormal signal of the spinal cord.   shunt was reset to 1.5 by neurosurgery with no plan for surgical intervention.  Neurology evaluated patient recommending to hold all antiplatelet and anticoagulation, EEG demonstrated encephalopathy.  CTA head and neck did demonstrate some stenosis of bilateral ICA but otherwise no occlusions or any concerning signs.  Neurology signed off with recommendations for MRI in 2 to 4 weeks.  Cardiology evaluated patient with concern for possible cardiac cause of syncope.  Recommendations for echocardiogram.  Awaiting clearance for discharge from cardiology pending echocardiogram.    Brief History:     This is a 70-year-old male with past medical history of NPH status post  shunt September 2024, hypertension presented to the emergency department after a fall at home. He states that he has had issues recently with his gait requiring use of a rollator walker since diagnosis of his NPH. He states he was in his kitchen making a sandwich when he fell.  He states he is not exactly sure what caused him to fall and is not sure if he lost consciousness.  He states that after the fall he remembers feeling a little bit confused.  He denies any chest pain, palpitations, shortness of breath prior to the event.    In the emergency department he was hypertensive requiring nicardipine drip.  Initial lab workup notable for mildly elevated troponins 32 -> 35.  Cardiology was consulted for elevated troponins and syncope.  CT head showed no acute findings.  CTA head and neck showed epidural density within the C-spine which could reflect postoperative  mg Oral Daily    gabapentin  300 mg Oral TID    oxyBUTYnin  10 mg Oral Daily    pantoprazole  40 mg Oral BID     Continuous Infusions:    sodium chloride       PRN Meds: labetalol, sodium chloride flush, sodium chloride, potassium chloride **OR** potassium alternative oral replacement **OR** potassium chloride, magnesium sulfate, ondansetron **OR** ondansetron, polyethylene glycol, acetaminophen **OR** acetaminophen    Data:     Past Medical History:   has no past medical history on file.    Social History:        Family History: No family history on file.    Vitals:  /71   Pulse 65   Temp 98.1 °F (36.7 °C) (Oral)   Resp 14   Ht 1.829 m (6')   Wt 80 kg (176 lb 5.9 oz)   SpO2 91%   BMI 23.92 kg/m²   Temp (24hrs), Av.2 °F (36.8 °C), Min:98 °F (36.7 °C), Max:98.4 °F (36.9 °C)    No results for input(s): \"POCGLU\" in the last 72 hours.    I/O (24Hr):    Intake/Output Summary (Last 24 hours) at 3/6/2025 0735  Last data filed at 3/5/2025 0854  Gross per 24 hour   Intake 131.11 ml   Output --   Net 131.11 ml       Labs:  Hematology:  Recent Labs     25   WBC 9.7   RBC 4.56   HGB 14.4   HCT 42.3   MCV 92.8   MCH 31.6   MCHC 34.0   RDW 14.3      MPV 10.3   INR 1.0     Chemistry:  Recent Labs     25  2134     --    K 3.9  --      --    CO2 25  --    GLUCOSE 95  --    BUN 19  --    CREATININE 0.9  --    ANIONGAP 10  --    LABGLOM 58*  --    TROPHS 32* 35*     Recent Labs     25  0611   LABA1C 5.2   TSH 2.23   AST 24   ALT 18   ALKPHOS 94   BILITOT 0.5   BILIDIR 0.2   CHOL 184   HDL 54   CHOLHDLRATIO 3.4   TRIG 86   VLDL 17     ABG:  Lab Results   Component Value Date/Time    FIO2 INFORMATION NOT PROVIDED 2025 06:11 AM     Lab Results   Component Value Date/Time    SPECIAL RIGHT HAND 10ML 2025 06:15 AM    SPECIAL LEFT AC 10ML 2025 06:15 AM     Lab Results   Component Value Date/Time    CULTURE POSITIVE Blood Culture (A) 2025 06:15

## 2025-03-06 NOTE — CARE COORDINATION
SBIRT-  Met with pt his date was alert and oriented  Pt denies ant drug use.  Pt states he quit drinking a while ago.  No previous rehab  Pt has no SI or recent feelings of depression                Alcohol Screening and Brief Intervention        No results for input(s): \"ALC\" in the last 72 hours.    Alcohol Pre-screening  (MEN ONLY) How many times in the past year have you had 5 or more drinks in a day?: None          Drug Pre-Screening none       Drug Screening DAST       Mood Pre-Screening (PHQ-2)  During the past 2 weeks, have you been bothered by, feeling down, depressed or hopeless?  No        I have interviewed Modesto Chan, [phone] regarding  His alcohol consumption/drug use and risk for excessive use. Screenings were negative.  Patient  N/A intervention at this time.     Deferred []    Completed on: 3/6/2025   MARLA NOBLE

## 2025-03-06 NOTE — PROGRESS NOTES
[facility]  [address]  [place] [address]  PH:  [phone] or [phone]  FAX:  [phone]            Brief history: Modesto Chan is a 70 y.o. old male admitted on 3/4/2025 with fall.      Subjective: No new neurological events overnight. Awaiting PT eval. EEG completed and showed slowing/encephalopathy.      Objective: /71   Pulse 65   Temp 98.1 °F (36.7 °C) (Oral)   Resp 14   Ht 1.829 m (6')   Wt 80 kg (176 lb 5.9 oz)   SpO2 91%   BMI 23.92 kg/m²       Medications:    sodium chloride flush  5-40 mL IntraVENous 2 times per day    amLODIPine  5 mg Oral Daily    gabapentin  300 mg Oral TID    oxyBUTYnin  10 mg Oral Daily    pantoprazole  40 mg Oral BID          Physical Exam:   /71   Pulse 65   Temp 98.1 °F (36.7 °C) (Oral)   Resp 14   Ht 1.829 m (6')   Wt 80 kg (176 lb 5.9 oz)   SpO2 91%   BMI 23.92 kg/m²   Temp (24hrs), Av.2 °F (36.8 °C), Min:98 °F (36.7 °C), Max:98.4 °F (36.9 °C)        General examination:      General Appearance:  alert, well appearing, and in no acute distress  HEENT: Normocephalic, atraumatic, moist mucus membranes  Neck: supple, no carotid bruits, (-) nuchal rigidity  Lungs:  Respirations unlabored, chest wall no deformity, BS normal  Cardiovascular: normal rate, regular rhythm  Abdomen: Soft, nontender, nondistended, normal bowel sounds  Skin: No gross lesions, rashes, bruising or bleeding on exposed skin area  Extremities:  peripheral pulses palpable, no cyanosis, clubbing or edema  Psych: normal affect      Neurological examination:    Mental status   Alert and oriented x 3; following all commands; speech is fluent, no dysarthria, aphasia.      Cranial nerves   II - visual fields intact to confrontation; pupils reactive  III, IV, VI - extraocular muscles intact; no MARYAM; no nystagmus; no ptosis   V - normal facial sensation                                                               VII - normal facial  chronic or subacute hemorrhage surrounding the thecal sac.    C2-C3: Moderate degenerative disc disease.  Posterior moderate disc bulge.  No definite cord compression.    Mild central stenosis.  Dorsal and ventral epidural hemorrhage is noted.  No  stenosis of the left neural foramen.    No significant stenosis of right neural foramen.    C3-C4: Moderate degenerative disc disease.  Evidence of thin epidural  hemorrhage surrounding the thecal sac.    No definite cord compression.  Mild generalized posterior disc bulge and  posterior spurring from endplates.    No significant central stenosis.  Moderate stenosis of left neural foramen.  No obvious stenosis of right neural foramen.    C4-C5: Moderate to severe degenerative disc disease.  Moderate-to-marked  anterior disc bulge with osteophytes from endplates.  Posteriorly there is  moderate disc bulge with osteophytes from endplates.  Ventral subarachnoid  space is markedly narrowed without definite cord compression.  Thin epidural  hemorrhage particularly on the left side.  Mild central stenosis.  Mild to  moderate stenosis of neural foramina bilaterally.    C5-C6: Moderate degenerative disc disease.  Mild to moderate anterior and  posterior disc bulge with osteophytes from endplates.  Moderate effacement of  ventral subarachnoid space.  No cord compression.  No significant central  stenosis.  Mild to moderate stenosis of neural foramina bilaterally.  Evidence of mild epidural hemorrhage at the ventral and left lateral aspect.    C6-C7: Moderate to severe degenerative disc disease.  Moderate posterior and  anterior disc bulge with osteophytes from endplates.  No significant central  stenosis.  No obvious left lateral stenosis.  Mild to moderate stenosis of  right neural foramen.  Evidence of thin ventral epidural hemorrhage.    C7-T1: Moderate degenerative disc disease.  Minimal C7 anterolisthesis.  Mild  to moderate facet osteoarthritis bilaterally.  No significant  an acute infarct.  A  ventriculoperitoneal shunt catheter on the right terminates in the left  lateral ventricle.  There is no evidence of hydrocephalus.    ORBITS: The visualized portion of the orbits demonstrate no acute abnormality.    SINUSES: The visualized paranasal sinuses and mastoid air cells demonstrate  no acute abnormality.    SOFT TISSUES/SKULL:  No acute abnormality of the visualized skull or soft  tissues.    Impression  No acute intracranial abnormality.      I personally reviewed all of the above medications, clinical laboratory, imaging and other diagnostic tests.     Assessment :          70-year-old male with a history of  shunt who presented as a stroke alert. Presented after fall when wife heard him hit the floor. No LOC was reported. Has a history of NPH with  shunt. He has a cervical decompression and fusion back on Sept 2024. Prior to this patient had been having multiple falls but wife states this is the first since his  shunt and cervical surgery.     CT head and CTA.  Normal without hydrocephalus or occlusions with some stenosis bilateral ICA on CTA.  Patient is at baseline.    On imaging there was concern for epidural or subdural hemorrhage surrounding the cervical thecal sac starting from the C1 level and extending to the lower most limit of T3. Neurosurgery was consulted emergently. MRI brain with dural thickening thought to be related to prior SDH.  No signs of altered mentation and patient is afebrile with no leukocytosis.   Plan:         Patient will be seen by the neurosurgery team who recommended holding the aspirin due to the chronic hemorrhages and to follow-up with his neurosurgeon outpatient  Hold all AC/AP  EEG showed encephalopathy  Recommend repeating MRI's in 2-4 weeks  PT/OT eval  No further recs. Please call with questions. Will sign off.       This note is created with the assistance of a speech-recognition program. While intending to generate a document that

## 2025-03-06 NOTE — PROGRESS NOTES
Physical Therapy  Facility/Department: [address] STEPDOWN   Physical Therapy Initial Evaluation    Patient Name: Modesto Chan        MRN: [id number]    : 1955    Date of Service: 3/6/2025    Chief Complaint   Patient presents with    Fall     70-year-old male with a history of  shunt who presented as a stroke alert. Presented after fall when wife heard him hit the floor. No LOC was reported. Has a history of NPH with  shunt. He has a cervical decompression and fusion back on 2024. Prior to this patient had been having multiple falls but wife states this is the first since his  shunt and cervical surgery.     Past Medical History:  has no past medical history on file.  Past Surgical History:  has no past surgical history on file.    Discharge Recommendations  Discharge Recommendations: Patient would benefit from continued therapy after discharge  PT Equipment Recommendations  Equipment Needed: Yes  Mobility Devices: Walker  Walker: Rolling    Assessment  Body Structures, Functions, Activity Limitations Requiring Skilled Therapeutic Intervention: Decreased strength, Decreased endurance, Decreased balance, Decreased safe awareness  Assessment: Pt able to ambulate 200 ft with rw and CGA, no loss of balance.  Pt CGA for transfers without device.  Pt completed 5 step ups with B UE support and box step CGA.  Pt will benefit from continued therapy to improve functional mobility and balance, endurance.  Therapy Prognosis: Good  Decision Making: Medium Complexity  Requires PT Follow-Up: Yes  Activity Tolerance  Activity Tolerance: Patient tolerated evaluation without incident  Safety Devices  Type of Devices: Call light within reach, Gait belt, Left in chair  Restraints  Restraints Initially in Place: No    AM-PAC  AM-PAC Basic Mobility - Inpatient   How much help is needed turning from your back to your side while in a flat bed without using bedrails?: A Little  How much help is needed moving from lying  None  Education Outcome: Verbalized understanding;Demonstrated understanding    Plan  Physical Therapy Plan  General Plan:  (5-6x/week)  Current Treatment Recommendations: Strengthening, Balance training, Functional mobility training, Transfer training, Endurance training, Neuromuscular re-education, Stair training, Gait training, Therapeutic activities, Safety education & training, Patient/Caregiver education & training    Goals  Patient Goals   Patient Goals : To return home  Short Term Goals  Time Frame for Short Term Goals: 14 visits  Short Term Goal 1: Pt indep with bed mobility  Short Term Goal 2: Pt indep with transfers with proper safety awareness  Short Term Goal 3: Pt amb 200 ft with least restrictive device independently, no loss of balance  Short Term Goal 4: Pt negotiate 4 stairs with SUP and rails, proper safety  Short Term Goal 5: Pt tolerate 25 minutes ther ex and activity to improve balance and strength for functional mobility    Minutes  PT Individual Minutes  Time In: 1033  Time Out: 1105  Minutes: 32  Time Code Minutes  Timed Code Treatment Minutes: 23 Minutes    Electronically signed by Padmini Encarnacion PT on 3/6/25 at 12:30 PM EST

## 2025-03-06 NOTE — PROGRESS NOTES
[facility]patient Progress Note  KYUNG Zamora   3/6/2025    Modesto Chan  1955  [id number]      Time spent with Patient: 0 minutes  Time spent with Family:     This writer was unable to complete screen or therapy services with patient at bedside at this time due to the following:  Other service provider in room / procedure being performed

## 2025-03-06 NOTE — PROGRESS NOTES
Speech Language Pathology  [facility]    Cognitive Treatment Note    Date: 3/6/2025  Patient’s Name: Modesto Chan  MRN: [id number]  Diagnosis:   Patient Active Problem List   Diagnosis Code    Closed traumatic brain injury, with unknown loss of consciousness status, initial encounter ([facility]) S06.9XAA    Transient alteration of awareness R40.4    Acute coronary syndrome ([facility]) I24.9    Fall W19.XXXA    Subdural hemorrhage ([facility]) I62.00    Syncope and collapse R55       Pain: 0/10    Cognitive Treatment    Treatment time: 3013-5312      Subjective: [x] Alert [x] Cooperative     [] Confused     [] Agitated    [] Lethargic      Objective/Assessment:    Recall:   Delayed Recall: 3/3 independently in 2 trials,  1/3 increased to 3/3 with min verbal cues     Problem Solving/Reasoning: Determining Category Exclusions: 10/10 independently   Stating Situational Problems: 12/12 independently  Word Deductions: 9/11 increased to 11/11 with min verbal cues   Analogies: 7/9 increased to 9/9 with mod verbal cues and repetition     Pt. Provided with education regarding memory compensatory strategies. Pt. Encouraged to utilize strategies once discharged from the hospital. Pt. Verbalized understanding.  Tip sheep left at bedside.          Plan:  [x] Continue ST services    [] Discharge from ST:      Discharge recommendations: [x]  Further therapy recommended at discharge.The patient should be able to tolerate at least 3 hours of therapy per day over 5 days or 15 hours over 7 days. [] Further therapy recommended at discharge.   [] No therapy recommended at discharge.          Completed by: Anette Ervin  Clinician    Cosigned By:   Yusra Elizondo M.S.CCC/SLP

## 2025-03-06 NOTE — PROGRESS NOTES
[facility] Inpatient Brief Diagnostic Assessment Note  KYUNG Zamora   3/6/2025    Modesto Chan  1955  [id number]      Time Spent with Patient: 15 minutes or less (Brief Diagnostic Assessment) 82010    Pt was provided informed consent for the [facility]. Discussed with patient model of service to include the limits of confidentiality (i.e. abuse reporting, suicide intervention, etc.) and short-term intervention focused approach.  Pt indicated understanding.    [facility] Inpatient or Virtual Question: This was not a virtual session    Is consult a Victim of Crime?: No    Presenting Patient Report:    Patient presents as a 70 y.o. male subsequent to hospital admission following fall resulting in injury.     Patient was able to complete the following screens: ITSS    Patient seen in room. Patient friendly and cooperative with assessment. Patient acknowledged strong family supports and is looking forward to going home. ITSS completed and [facility] information packet provided.      MSE:     Appearance:   Hospital Gown  Speech    spontaneous, normal rate, and normal volume  Affect Observed   Appropriate to Context  Thought Content    intact  Thought Process    linear, goal directed, and coherent  Associations    logical connections  Insight    Good  Judgment    Intact  Orientation    oriented to person, place, time, and general circumstances      Patient reports and/or exhibits the following symptoms:    Mood: Appropriate to Context    Cognitive symptoms: Appropriate to Context    Behaviors: Appropriate to Context    Somatic: None Reported        Assessment:    Patient denies any previous or current symptoms for depression or anxiety.  Patient scored low on Injured Trauma Survivor Screen (ITSS).  Patient does not meet criteria for depression or anxiety diagnosis at this time.     Screening Scale Results (If Any):    ITSS:  Date Screen Completed: 3/6/2025  Patient's score= 0  for PTSD risk. ( >= 2 is positive for PTSD risk. )  Patient's score= 0 for depression risk.  ( >= 2 is positive for Depression risk )            Chief Complaint / Diagnoses: The following Chief Complaint or Diagnoses are based on currently available information and may change as additional information becomes available.  Observation for suspected mental condition, no diagnosis Z03.89        Patient Interventions:  Psychoeducation  and Brief Diagnostic Assessment       Recommendations:  No outpatient mental health services recommended    Plan:  No plan for bedside follow-up during hospital admission

## 2025-03-07 ENCOUNTER — APPOINTMENT (OUTPATIENT)
Age: 70
End: 2025-03-07
Attending: STUDENT IN AN ORGANIZED HEALTH CARE EDUCATION/TRAINING PROGRAM
Payer: MEDICARE

## 2025-03-07 VITALS
BODY MASS INDEX: 23.89 KG/M2 | RESPIRATION RATE: 11 BRPM | TEMPERATURE: 98 F | TEMPERATURE: 98 F | HEART RATE: 72 BPM | WEIGHT: 176.37 LBS | RESPIRATION RATE: 11 BRPM | DIASTOLIC BLOOD PRESSURE: 79 MMHG | OXYGEN SATURATION: 99 % | WEIGHT: 176.37 LBS | HEIGHT: 72 IN | OXYGEN SATURATION: 99 % | SYSTOLIC BLOOD PRESSURE: 147 MMHG | SYSTOLIC BLOOD PRESSURE: 147 MMHG | BODY MASS INDEX: 23.89 KG/M2 | HEIGHT: 72 IN | DIASTOLIC BLOOD PRESSURE: 79 MMHG | HEART RATE: 72 BPM

## 2025-03-07 LAB
ANION GAP SERPL CALCULATED.3IONS-SCNC: 10 MMOL/L (ref 9–16)
ANION GAP SERPL CALCULATED.3IONS-SCNC: 10 MMOL/L (ref 9–16)
BASOPHILS # BLD: 0.05 K/UL (ref 0–0.2)
BASOPHILS # BLD: 0.05 K/UL (ref 0–0.2)
BASOPHILS NFR BLD: 1 % (ref 0–2)
BASOPHILS NFR BLD: 1 % (ref 0–2)
BUN SERPL-MCNC: 18 MG/DL (ref 8–23)
BUN SERPL-MCNC: 18 MG/DL (ref 8–23)
CALCIUM SERPL-MCNC: 9 MG/DL (ref 8.6–10.4)
CALCIUM SERPL-MCNC: 9 MG/DL (ref 8.6–10.4)
CHLORIDE SERPL-SCNC: 110 MMOL/L (ref 98–107)
CHLORIDE SERPL-SCNC: 110 MMOL/L (ref 98–107)
CO2 SERPL-SCNC: 21 MMOL/L (ref 20–31)
CO2 SERPL-SCNC: 21 MMOL/L (ref 20–31)
CREAT SERPL-MCNC: 0.9 MG/DL (ref 0.7–1.2)
CREAT SERPL-MCNC: 0.9 MG/DL (ref 0.7–1.2)
ECHO AV MEAN GRADIENT: 3 MMHG
ECHO AV MEAN GRADIENT: 3 MMHG
ECHO AV MEAN VELOCITY: 0.7 M/S
ECHO AV MEAN VELOCITY: 0.7 M/S
ECHO AV PEAK GRADIENT: 5 MMHG
ECHO AV PEAK GRADIENT: 5 MMHG
ECHO AV PEAK VELOCITY: 1.2 M/S
ECHO AV PEAK VELOCITY: 1.2 M/S
ECHO AV VELOCITY RATIO: 0.67
ECHO AV VELOCITY RATIO: 0.67
ECHO AV VTI: 22.9 CM
ECHO AV VTI: 22.9 CM
ECHO LA AREA 2C: 12.6 CM2
ECHO LA AREA 2C: 12.6 CM2
ECHO LA AREA 4C: 18.2 CM2
ECHO LA AREA 4C: 18.2 CM2
ECHO LA DIAMETER INDEX: 1.93 CM/M2
ECHO LA DIAMETER INDEX: 1.93 CM/M2
ECHO LA DIAMETER: 3.9 CM
ECHO LA DIAMETER: 3.9 CM
ECHO LA MAJOR AXIS: 5.4 CM
ECHO LA MAJOR AXIS: 5.4 CM
ECHO LA MINOR AXIS: 4.4 CM
ECHO LA MINOR AXIS: 4.4 CM
ECHO LA VOL BP: 42 ML (ref 18–58)
ECHO LA VOL BP: 42 ML (ref 18–58)
ECHO LA VOL MOD A2C: 30 ML (ref 18–58)
ECHO LA VOL MOD A2C: 30 ML (ref 18–58)
ECHO LA VOL MOD A4C: 49 ML (ref 18–58)
ECHO LA VOL MOD A4C: 49 ML (ref 18–58)
ECHO LA VOL/BSA BIPLANE: 21 ML/M2 (ref 16–34)
ECHO LA VOL/BSA BIPLANE: 21 ML/M2 (ref 16–34)
ECHO LA VOLUME INDEX MOD A2C: 15 ML/M2 (ref 16–34)
ECHO LA VOLUME INDEX MOD A2C: 15 ML/M2 (ref 16–34)
ECHO LA VOLUME INDEX MOD A4C: 24 ML/M2 (ref 16–34)
ECHO LA VOLUME INDEX MOD A4C: 24 ML/M2 (ref 16–34)
ECHO LV E' LATERAL VELOCITY: 5.77 CM/S
ECHO LV E' LATERAL VELOCITY: 5.77 CM/S
ECHO LV E' SEPTAL VELOCITY: 7.4 CM/S
ECHO LV E' SEPTAL VELOCITY: 7.4 CM/S
ECHO LV EDV A2C: 36 ML
ECHO LV EDV A2C: 36 ML
ECHO LV EDV A4C: 53 ML
ECHO LV EDV A4C: 53 ML
ECHO LV EDV INDEX A4C: 26 ML/M2
ECHO LV EDV INDEX A4C: 26 ML/M2
ECHO LV EDV NDEX A2C: 18 ML/M2
ECHO LV EDV NDEX A2C: 18 ML/M2
ECHO LV EF PHYSICIAN: 66 %
ECHO LV EF PHYSICIAN: 66 %
ECHO LV EJECTION FRACTION A2C: 63 %
ECHO LV EJECTION FRACTION A2C: 63 %
ECHO LV EJECTION FRACTION A4C: 67 %
ECHO LV EJECTION FRACTION A4C: 67 %
ECHO LV EJECTION FRACTION BIPLANE: 66 % (ref 55–100)
ECHO LV EJECTION FRACTION BIPLANE: 66 % (ref 55–100)
ECHO LV ESV A2C: 13 ML
ECHO LV ESV A2C: 13 ML
ECHO LV ESV A4C: 17 ML
ECHO LV ESV A4C: 17 ML
ECHO LV ESV INDEX A2C: 6 ML/M2
ECHO LV ESV INDEX A2C: 6 ML/M2
ECHO LV ESV INDEX A4C: 8 ML/M2
ECHO LV ESV INDEX A4C: 8 ML/M2
ECHO LV FRACTIONAL SHORTENING: 34 % (ref 28–44)
ECHO LV FRACTIONAL SHORTENING: 34 % (ref 28–44)
ECHO LV INTERNAL DIMENSION DIASTOLE INDEX: 1.88 CM/M2
ECHO LV INTERNAL DIMENSION DIASTOLE INDEX: 1.88 CM/M2
ECHO LV INTERNAL DIMENSION DIASTOLIC: 3.8 CM (ref 4.2–5.9)
ECHO LV INTERNAL DIMENSION DIASTOLIC: 3.8 CM (ref 4.2–5.9)
ECHO LV INTERNAL DIMENSION SYSTOLIC INDEX: 1.24 CM/M2
ECHO LV INTERNAL DIMENSION SYSTOLIC INDEX: 1.24 CM/M2
ECHO LV INTERNAL DIMENSION SYSTOLIC: 2.5 CM
ECHO LV INTERNAL DIMENSION SYSTOLIC: 2.5 CM
ECHO LV IVSD: 1 CM (ref 0.6–1)
ECHO LV IVSD: 1 CM (ref 0.6–1)
ECHO LV MASS 2D: 125.8 G (ref 88–224)
ECHO LV MASS 2D: 125.8 G (ref 88–224)
ECHO LV MASS INDEX 2D: 62.3 G/M2 (ref 49–115)
ECHO LV MASS INDEX 2D: 62.3 G/M2 (ref 49–115)
ECHO LV POSTERIOR WALL DIASTOLIC: 1.1 CM (ref 0.6–1)
ECHO LV POSTERIOR WALL DIASTOLIC: 1.1 CM (ref 0.6–1)
ECHO LV RELATIVE WALL THICKNESS RATIO: 0.58
ECHO LV RELATIVE WALL THICKNESS RATIO: 0.58
ECHO LVOT AV VTI INDEX: 0.83
ECHO LVOT AV VTI INDEX: 0.83
ECHO LVOT MEAN GRADIENT: 1 MMHG
ECHO LVOT MEAN GRADIENT: 1 MMHG
ECHO LVOT PEAK GRADIENT: 2 MMHG
ECHO LVOT PEAK GRADIENT: 2 MMHG
ECHO LVOT PEAK VELOCITY: 0.8 M/S
ECHO LVOT PEAK VELOCITY: 0.8 M/S
ECHO LVOT VTI: 19 CM
ECHO LVOT VTI: 19 CM
ECHO MV A VELOCITY: 0.74 M/S
ECHO MV A VELOCITY: 0.74 M/S
ECHO MV E DECELERATION TIME (DT): 282 MS
ECHO MV E DECELERATION TIME (DT): 282 MS
ECHO MV E VELOCITY: 0.65 M/S
ECHO MV E VELOCITY: 0.65 M/S
ECHO MV E/A RATIO: 0.88
ECHO MV E/A RATIO: 0.88
ECHO MV E/E' LATERAL: 11.27
ECHO MV E/E' LATERAL: 11.27
ECHO MV E/E' RATIO (AVERAGED): 10.02
ECHO MV E/E' RATIO (AVERAGED): 10.02
ECHO MV E/E' SEPTAL: 8.78
ECHO MV E/E' SEPTAL: 8.78
ECHO MV LVOT VTI INDEX: 1.39
ECHO MV LVOT VTI INDEX: 1.39
ECHO MV MAX VELOCITY: 0.9 M/S
ECHO MV MAX VELOCITY: 0.9 M/S
ECHO MV MEAN GRADIENT: 2 MMHG
ECHO MV MEAN GRADIENT: 2 MMHG
ECHO MV MEAN VELOCITY: 0.6 M/S
ECHO MV MEAN VELOCITY: 0.6 M/S
ECHO MV PEAK GRADIENT: 3 MMHG
ECHO MV PEAK GRADIENT: 3 MMHG
ECHO MV VTI: 26.4 CM
ECHO MV VTI: 26.4 CM
ECHO PVEIN A VELOCITY: 0.3 M/S
ECHO PVEIN A VELOCITY: 0.3 M/S
ECHO RV FREE WALL PEAK S': 14.3 CM/S
ECHO RV FREE WALL PEAK S': 14.3 CM/S
ECHO RV MID DIMENSION: 3.4 CM
ECHO RV MID DIMENSION: 3.4 CM
ECHO RV TAPSE: 2.2 CM (ref 1.7–?)
ECHO RV TAPSE: 2.2 CM (ref 1.7–?)
EOSINOPHIL # BLD: 0.24 K/UL (ref 0–0.44)
EOSINOPHIL # BLD: 0.24 K/UL (ref 0–0.44)
EOSINOPHILS RELATIVE PERCENT: 3 % (ref 1–4)
EOSINOPHILS RELATIVE PERCENT: 3 % (ref 1–4)
ERYTHROCYTE [DISTWIDTH] IN BLOOD BY AUTOMATED COUNT: 14.1 % (ref 11.8–14.4)
ERYTHROCYTE [DISTWIDTH] IN BLOOD BY AUTOMATED COUNT: 14.1 % (ref 11.8–14.4)
GFR, ESTIMATED: >90 ML/MIN/1.73M2
GFR, ESTIMATED: >90 ML/MIN/1.73M2
GLUCOSE SERPL-MCNC: 101 MG/DL (ref 74–99)
GLUCOSE SERPL-MCNC: 101 MG/DL (ref 74–99)
HCT VFR BLD AUTO: 41.6 % (ref 40.7–50.3)
HCT VFR BLD AUTO: 41.6 % (ref 40.7–50.3)
HGB BLD-MCNC: 13.7 G/DL (ref 13–17)
HGB BLD-MCNC: 13.7 G/DL (ref 13–17)
IMM GRANULOCYTES # BLD AUTO: <0.03 K/UL (ref 0–0.3)
IMM GRANULOCYTES # BLD AUTO: <0.03 K/UL (ref 0–0.3)
IMM GRANULOCYTES NFR BLD: 0 %
IMM GRANULOCYTES NFR BLD: 0 %
LYMPHOCYTES NFR BLD: 2.33 K/UL (ref 1.1–3.7)
LYMPHOCYTES NFR BLD: 2.33 K/UL (ref 1.1–3.7)
LYMPHOCYTES RELATIVE PERCENT: 27 % (ref 24–43)
LYMPHOCYTES RELATIVE PERCENT: 27 % (ref 24–43)
MAGNESIUM SERPL-MCNC: 2.2 MG/DL (ref 1.6–2.4)
MAGNESIUM SERPL-MCNC: 2.2 MG/DL (ref 1.6–2.4)
MCH RBC QN AUTO: 31.1 PG (ref 25.2–33.5)
MCH RBC QN AUTO: 31.1 PG (ref 25.2–33.5)
MCHC RBC AUTO-ENTMCNC: 32.9 G/DL (ref 28.4–34.8)
MCHC RBC AUTO-ENTMCNC: 32.9 G/DL (ref 28.4–34.8)
MCV RBC AUTO: 94.3 FL (ref 82.6–102.9)
MCV RBC AUTO: 94.3 FL (ref 82.6–102.9)
MONOCYTES NFR BLD: 0.73 K/UL (ref 0.1–1.2)
MONOCYTES NFR BLD: 0.73 K/UL (ref 0.1–1.2)
MONOCYTES NFR BLD: 9 % (ref 3–12)
MONOCYTES NFR BLD: 9 % (ref 3–12)
NEUTROPHILS NFR BLD: 60 % (ref 36–65)
NEUTROPHILS NFR BLD: 60 % (ref 36–65)
NEUTS SEG NFR BLD: 5.12 K/UL (ref 1.5–8.1)
NEUTS SEG NFR BLD: 5.12 K/UL (ref 1.5–8.1)
NRBC BLD-RTO: 0 PER 100 WBC
NRBC BLD-RTO: 0 PER 100 WBC
PLATELET # BLD AUTO: 245 K/UL (ref 138–453)
PLATELET # BLD AUTO: 245 K/UL (ref 138–453)
PMV BLD AUTO: 10.6 FL (ref 8.1–13.5)
PMV BLD AUTO: 10.6 FL (ref 8.1–13.5)
POTASSIUM SERPL-SCNC: 4.3 MMOL/L (ref 3.7–5.3)
POTASSIUM SERPL-SCNC: 4.3 MMOL/L (ref 3.7–5.3)
RBC # BLD AUTO: 4.41 M/UL (ref 4.21–5.77)
RBC # BLD AUTO: 4.41 M/UL (ref 4.21–5.77)
SODIUM SERPL-SCNC: 141 MMOL/L (ref 136–145)
SODIUM SERPL-SCNC: 141 MMOL/L (ref 136–145)
WBC OTHER # BLD: 8.5 K/UL (ref 3.5–11.3)
WBC OTHER # BLD: 8.5 K/UL (ref 3.5–11.3)

## 2025-03-07 PROCEDURE — 2500000003 HC RX 250 WO HCPCS: Performed by: NURSE PRACTITIONER

## 2025-03-07 PROCEDURE — 36415 COLL VENOUS BLD VENIPUNCTURE: CPT

## 2025-03-07 PROCEDURE — 99232 SBSQ HOSP IP/OBS MODERATE 35: CPT | Performed by: STUDENT IN AN ORGANIZED HEALTH CARE EDUCATION/TRAINING PROGRAM

## 2025-03-07 PROCEDURE — 85025 COMPLETE CBC W/AUTO DIFF WBC: CPT

## 2025-03-07 PROCEDURE — 80048 BASIC METABOLIC PNL TOTAL CA: CPT

## 2025-03-07 PROCEDURE — 97129 THER IVNTJ 1ST 15 MIN: CPT

## 2025-03-07 PROCEDURE — 93306 TTE W/DOPPLER COMPLETE: CPT

## 2025-03-07 PROCEDURE — 6370000000 HC RX 637 (ALT 250 FOR IP): Performed by: STUDENT IN AN ORGANIZED HEALTH CARE EDUCATION/TRAINING PROGRAM

## 2025-03-07 PROCEDURE — 99233 SBSQ HOSP IP/OBS HIGH 50: CPT | Performed by: SURGERY

## 2025-03-07 PROCEDURE — 83735 ASSAY OF MAGNESIUM: CPT

## 2025-03-07 PROCEDURE — 93306 TTE W/DOPPLER COMPLETE: CPT | Performed by: INTERNAL MEDICINE

## 2025-03-07 PROCEDURE — 97130 THER IVNTJ EA ADDL 15 MIN: CPT

## 2025-03-07 RX ORDER — AMLODIPINE BESYLATE 10 MG/1
10 TABLET ORAL DAILY
Qty: 30 TABLET | Refills: 0 | Status: SHIPPED | OUTPATIENT
Start: 2025-03-07

## 2025-03-07 RX ORDER — AMLODIPINE BESYLATE 5 MG/1
10 TABLET ORAL DAILY
Qty: 30 TABLET | Refills: 0 | Status: SHIPPED | OUTPATIENT
Start: 2025-03-07 | End: 2025-03-07

## 2025-03-07 RX ORDER — AMLODIPINE BESYLATE 5 MG/1
5 TABLET ORAL 2 TIMES DAILY
Status: DISCONTINUED | OUTPATIENT
Start: 2025-03-07 | End: 2025-03-07 | Stop reason: HOSPADM

## 2025-03-07 RX ADMIN — GABAPENTIN 300 MG: 300 CAPSULE ORAL at 14:49

## 2025-03-07 RX ADMIN — GABAPENTIN 300 MG: 300 CAPSULE ORAL at 08:00

## 2025-03-07 RX ADMIN — SODIUM CHLORIDE, PRESERVATIVE FREE 10 ML: 5 INJECTION INTRAVENOUS at 08:01

## 2025-03-07 RX ADMIN — AMLODIPINE BESYLATE 5 MG: 5 TABLET ORAL at 08:00

## 2025-03-07 RX ADMIN — OXYBUTYNIN CHLORIDE 10 MG: 10 TABLET, EXTENDED RELEASE ORAL at 08:00

## 2025-03-07 RX ADMIN — PANTOPRAZOLE SODIUM 40 MG: 40 TABLET, DELAYED RELEASE ORAL at 08:00

## 2025-03-07 NOTE — PROGRESS NOTES
[facility]  Progress Note                   Date:   3/7/2025  Patient name: Modesto Chan  Date of admission:  3/4/2025  7:38 PM  MRN:   [id number]  YOB: 1955  PCP: No primary care provider on file.    Reason for Admission: Subdural hemorrhage (HCC) [I62.00]  Transient alteration of awareness [R40.4]  Acute coronary syndrome (HCC) [I24.9]  Fall, initial encounter [W19.XXXA]  Closed traumatic brain injury, with unknown loss of consciousness status, initial encounter ([facility]) [S06.9XAA]    Subjective:       Clinical Changes /Abnormalities:Patient seen and examined. Denies chest pain or shortness of breath. Tele/vitals/labs reviewed .  Blood pressure elevated    Review of Systems    Medications:   Scheduled Meds:   sodium chloride flush  5-40 mL IntraVENous 2 times per day    amLODIPine  5 mg Oral Daily    gabapentin  300 mg Oral TID    oxyBUTYnin  10 mg Oral Daily    pantoprazole  40 mg Oral BID     Continuous Infusions:   sodium chloride       CBC:   Recent Labs     03/1955 03/07/25  0837   WBC 9.7 8.5   HGB 14.4 13.7    245     BMP:    Recent Labs     03/1955 03/07/25  0837    141   K 3.9 4.3    110*   CO2 25 21   BUN 19 18   CREATININE 0.9 0.9   GLUCOSE 95 101*     Hepatic:  Recent Labs     03/05/25  0611   AST 24   ALT 18   BILITOT 0.5   ALKPHOS 94     Troponin:   Recent Labs     03/1955 03/04/25  2134   TROPHS 32* 35*     BNP: No results for input(s): \"BNP\" in the last 72 hours.  Lipids:   Recent Labs     03/05/25  0611   CHOL 184   HDL 54     INR:   Recent Labs     03/1955   INR 1.0       Objective:   Vitals: BP (!) 156/89   Pulse 84   Temp 98.1 °F (36.7 °C) (Oral)   Resp 15   Ht 1.829 m (6')   Wt 80 kg (176 lb 5.9 oz)   SpO2 94%   BMI 23.92 kg/m²   General appearance: alert and cooperative with exam  HEENT: Head: Normocephalic, no lesions, without obvious abnormality.  Neck:no JVD, trachea midline, no adenopathy  Lungs:  Clear to auscultation  Heart: Regular rate and rhythm, s1/s2 auscultated, no murmurs  Abdomen: soft, non-tender, bowel sounds active  Extremities: no edema  Neurologic: not done        Assessment / Acute Cardiac Problems:   Hypertensive emergency - SBP initally 170s-200s  Elevated troponin - no repeat available, no chest pain  Recurrent Fall from standing - more likely related to patient's balance, cervical decompression and fusion back on Sept 2024.   subdural hemorrhage  Normal Pressure Hydrocephalus -  shunt placed in September  Diverticulosis  Former smoker - about 50 pack-years  Full code    Patient Active Problem List:     Closed traumatic brain injury, with unknown loss of consciousness status, initial encounter ([facility])     Transient alteration of awareness     Acute coronary syndrome (HCC)     Fall     Subdural hemorrhage (HCC)     Syncope     Post concussive encephalopathy     Elevated troponin      Plan of Treatment:   Orthostatic blood pressure initially borderline positive avoid dehydration  Can increase Norvasc to 10 mg daily  chronic subdural hemorrhages and hematoma surrounding the cervical thecal sac  -neurology recommending holding aspirin and AC  Echo pending to eval for valvular abnormality and ejection fraction however no plan for invasive cardiac workup this visit  as he is not a candidate for asa/ac     Electronically signed by NAVYA Szymanski - NP on 3/7/2025 at 10:34 AM  [facility]  [phone]

## 2025-03-07 NOTE — CARE COORDINATION
Case Management   Daily Progress Note       Patient Name: Modesto Chan                   YOB: 1955  Diagnosis: Subdural hemorrhage (HCC) [I62.00]  Transient alteration of awareness [R40.4]  Acute coronary syndrome (HCC) [I24.9]  Fall, initial encounter [W19.XXXA]  Closed traumatic brain injury, with unknown loss of consciousness status, initial encounter ([facility]) [S06.9XAA]                       GMLOS: 3.3 days  Length of Stay: 2  days    Anticipated Discharge Date: Ready for discharge    Readmission Risk (Low < 19, Mod (19-27), High > 27): Readmission Risk Score: 6.4      Patient Transitional Goal: Home    Current Transitional Plan    [x] Home Independently    [] Home with HC    [] Skilled Nursing Facility    [] Acute Rehabilitation    [] Long Term Acute Care (LTAC)    [] Other:     Plan for the Stay (Medical Management) :          Workflow Continuation (Additional Notes) :    Met with patient today and he continues to support plan to discharge home with family. Pt. States he wife will assist with transportation home. Continues to deny any needs/services.       Arturo Singletary  March 7, 2025

## 2025-03-07 NOTE — PROGRESS NOTES
[facility]  Office: [phone]  Heber Seo DO, Angel Luis Weems DO, Emanuel Calderón DO, Tim Ricks DO, Yasmin Kyle MD, Romina Rebollar MD, Michelle Moreno MD, Jenifer Khanna MD,  Rober Mir MD, Stephon Whittington MD, Geneva Marroquin MD,  Phyllis Bruce DO, Tania Hogan MD, Jaun Urena MD, Arsenio Seo DO, Sun Briones MD,  Rodolfo Colon DO, Jaz Chawla MD, Eleonora Molina MD, Cassie Emmanuel MD, Chris Tavares MD,  Ambrose Gómez MD, Raul Marinelli MD, Suze Cline MD, Flora Barragan MD, Honorio Tinoco MD, Loren Najera MD, Festus Holbrook DO, Enrique Mcpherson MD, Phyllis Rivera MD, Mohsin Reza, MD, Shirley Waterhouse, CNP,  Maru Philippe CNP, Festus Atkinson, CNP,  Alona Paredes, DNP, Aylin Aguiar, CNP, Leann Qureshi, CNP, Emily Aguilar, CNP, Luisa Loving CNP, Carolynn Biggs, PA-C, Jael Ramirez, CNP, Freda Andino, CNP,  Maria Campo, CNP, Padmini Venegas, CNP, Myrtle Benjamin, CNP,  Scarlet Duong, CNS, Chiqui Hurtado CNP, Delia Marmolejo CNP,   Roseline Hair, CNP         [facility]   IN-PATIENT SERVICE   [facility]    Progress Note    3/7/2025    7:57 AM    Name:   Modesto Chan  MRN:     [id number]     Acct:      [id number]   Room:   0133/0133-01   Day:  2  Admit Date:  3/4/2025  7:38 PM    PCP:   No primary care provider on file.  Code Status:  Full Code    Subjective:     C/C:   Chief Complaint   Patient presents with    Fall     Interval History Status: improved.     Vitals reviewed, afebrile and hemodynamically stable.  Saturating well on room air.  Labs reviewed. Stable.   Overnight patient had no significant events.    On examination patient resting comfortably in bed. No complaints. More awake and alert today. Cardiology evaluated patient with concern for possible cardiac cause of syncope.  Recommendations for echocardiogram.  Awaiting clearance for discharge from cardiology pending echocardiogram.    Brief History:   Continue outpatient follow-up.    Tobacco use disorder.  Encourage cessation.    DVT prophylaxis: None given chronic versus subacute subdural hematomas.  Ensure EPC cuffs in place.  GI prophylaxis: Protonix 40 mg twice daily.    Discharge planning: Patient has been cleared for discharge from neurosurgery and neurology with plan for outpatient follow-up with his neurosurgeon.  Awaiting cardiology clearance following echocardiogram given elevated troponin and possible syncope.    Rodolfo Colno DO  3/7/2025  7:57 AM

## 2025-03-07 NOTE — PROGRESS NOTES
Speech Language Pathology  [facility]    Cognitive Treatment Note    Date: 3/7/2025  Patient’s Name: Modesto Chan  MRN: [id number]  Diagnosis:   Patient Active Problem List   Diagnosis Code    Closed traumatic brain injury, with unknown loss of consciousness status, initial encounter ([facility]) S06.9XAA    Transient alteration of awareness R40.4    Acute coronary syndrome ([facility]) I24.9    Fall W19.XXXA    Subdural hemorrhage ([facility]) I62.00    Syncope R55    Post concussive encephalopathy F07.81    Elevated troponin R79.89       Pain: 0/10    Cognitive Treatment    Treatment time: 952-1018      Subjective: [x] Alert [x] Cooperative     [] Confused     [] Agitated    [] Lethargic      Objective/Assessment:    Recall:   Delayed Recall: 2/3 increased to 3/3 in 3 trials    Problem Solving/Reasoning:   Wrong Category [place]: 5/5 independently  State the Category Abstract: 13/16 increased to 16 with mod verbal cues   Category Members [place]: 12/16 increased to 16 with mod verbal cues       Plan:  [x] Continue ST services    [] Discharge from ST:      Discharge recommendations: [x]  Further therapy recommended at discharge.The patient should be able to tolerate at least 3 hours of therapy per day over 5 days or 15 hours over 7 days. [] Further therapy recommended at discharge.   [] No therapy recommended at discharge.    Pt. Provided with education regarding memory compensatory strategies. Pt. Encouraged to utilize strategies once discharged from the hospital. Pt. Verbalized understanding.  Tip sheep left at bedside.        Completed by: Anette Ervin  Clinician    Cosigned By:  Emily Holliday M.A. CCC/SLP

## 2025-03-07 NOTE — DISCHARGE INSTRUCTIONS
Follow up with your PCP in 3 days. Call for an appointment as soon as possible.  Follow-up with specialist as instructed.  Call for an appointment as soon as possible.  - Follow up with your Neurosurgeon as soon as possible.   - Follow up with your cardiologist or you may set up an appointment with the cardiologists that saw you while you were here.   Medications as instructed.  - Your Norvasc was increased to 10 mg daily from 5 mg daily due to your elevated blood pressures. Please check your blood pressure daily to monitor your blood pressure closely on your new medication. If your blood pressures are running low (top number less than 100 or you experience increased dizziness on your new medications) please contact your primary care doctor as soon as possible regarding your meds or return to ER if this persists. I would so recommending not taking your Norvasc if you check your blood pressure before your medication and your blood pressure is less than 110/80 mmHg.  Return to the emergency department immediately for any new or worsening concerns.

## 2025-03-07 NOTE — DISCHARGE SUMMARY
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 He states that he has had issues recently with his gait requiring use of a rollator walker since diagnosis of his NPH. He states he was in his kitchen making a sandwich when he fell.  He states he is not exactly sure what caused him to fall and is not sure if he lost consciousness.  He states that after the fall he remembers feeling a little bit confused.  He denies any chest pain, palpitations, shortness of breath prior to the event.     In the emergency department he was hypertensive requiring nicardipine drip.  Initial lab workup notable for mildly elevated troponins 32 -> 35.  Cardiology was consulted for elevated troponins and syncope.  CT head showed no acute findings.  CTA head and neck showed epidural density within the C-spine which could reflect postoperative change or epidural hematoma.  Neurosurgery was consulted, no neurosurgical intervention warranted.  Neurology was also consulted and ordered EEG.     Discussed in detail with patient's wife and patient.  Patient's wife states that patient was making a sandwich at home over-the-counter followed by a loud thud.  Went to see and found her  on the ground on his back.  Helped him up and then patient had lunch and took a nap.  She states following the nap he appeared confused not recognizing his child or wife and speaking \" gibberish\".  He was evaluated by neurosurgery and his  shunt was found to be at 0.5 however supposed to be set at 1.5.  MRI cervical spine demonstrated subdural hematoma at the superior aspect of the cervical spine with no abnormal signal of the spinal cord.   shunt was reset to 1.5 by neurosurgery with no plan for surgical intervention.  Neurology evaluated patient recommending to hold all antiplatelet and  medications were sent to [facility] - [place], [place] - [address] - P [phone] - F [phone] [address] [place] [address]      Phone: [phone]   amLODIPine 5 MG tablet         No discharge procedures on file.    Time Spent on discharge is  31 mins in patient examination, evaluation, counseling as well as medication reconciliation, prescriptions for required medications, discharge plan and follow up.    Electronically signed by   Rodolfo Colon DO  3/7/2025  1:16 PM      Thank you Dr. Boyer primary care provider on file. for the opportunity to be involved in this patient's care.

## 2025-03-07 NOTE — PROGRESS NOTES
Pharmacy brings meds. Wife & daughter also come. Discharge instructions given to patient & spouse. Both verbalize understanding of discharge instructions. Home with family

## 2025-03-07 NOTE — PROGRESS NOTES
CLINICAL PHARMACY NOTE: MEDS TO BEDS    Total # of Prescriptions Filled: 1   The following medications were delivered to the patient:  amlodipine    Additional Documentation:

## 2025-03-09 LAB
MICROORGANISM SPEC CULT: NORMAL
SERVICE CMNT-IMP: NORMAL
SPECIMEN DESCRIPTION: NORMAL

## 2025-03-10 LAB
MICROORGANISM SPEC CULT: NORMAL
SERVICE CMNT-IMP: NORMAL
SPECIMEN DESCRIPTION: NORMAL

## 2025-03-10 NOTE — PROGRESS NOTES
[facility] ([facility]) Inpatient Discharge Summary  KYUNG Zamora  3/10/2025        [place] Rocio  1955  [id number]    Date & Time of Discharge: 3/7/2025  5:30 PM     Is consult a Victim of Crime?: No    Services provided:  Screenings: ITSS and Informational Packet Provided    Screen Results (If any):      ITSS:  Date Screen Completed: 3/6/2025  Patient's score= 0 for PTSD risk. ( >= 2 is positive for PTSD risk. )  Patient's score= 0 for depression risk.  ( >= 2 is positive for Depression risk )        Discharge Recommendations:  No outpatient mental health services recommended      The therapist may be reached through the [facility] offices at [phone].

## (undated) DEVICE — GLOVE ORANGE PI 7 1/2   MSG9075

## (undated) DEVICE — SPONGE DRN W4XL4IN RAYON/POLYESTER 6 PLY NONWOVEN PRECUT

## (undated) DEVICE — 3M™ PRECISE™ VISTA DISPOSABLE SKIN STAPLER 3995: Brand: 3M™ PRECISE™

## (undated) DEVICE — Device

## (undated) DEVICE — POLYP TRAP: Brand: TRAPEASE®

## (undated) DEVICE — BINDER ABD UNIV H9IN WAIST 30-45IN E SFT COT PREM 3 PNL

## (undated) DEVICE — GAUZE,SPONGE,FLUFF,6"X6.75",STRL,5/TRAY: Brand: MEDLINE

## (undated) DEVICE — SUTURE PERMAHAND SZ 0 L30IN NONABSORBABLE BLK FSL L30MM 3/8 680H

## (undated) DEVICE — SUTURE VCRL + SZ 2-0 L54IN ABSRB VLT TIE POLYGLACTIN 910 VCP286G

## (undated) DEVICE — GLOVE SURG SZ 75 L12IN FNGR THK87MIL DK GRN LTX FREE ISOLEX

## (undated) DEVICE — ELECTRODE PT RET AD L9FT HI MOIST COND ADH HYDRGEL CORDED

## (undated) DEVICE — TRAY CATH 16FR F INCLUDE LUB DRNGE BG STATLOK STBL DEV

## (undated) DEVICE — 3M™ WARMING BLANKET, UPPER BODY, 10 PER CASE, 42268: Brand: BAIR HUGGER™

## (undated) DEVICE — FORCEPS BX L240CM WRK CHN 2.8MM STD CAP W/ NDL MIC MESH

## (undated) DEVICE — MEDI-VAC YANKAUER SUCTION HANDLE W/BULBOUS TIP: Brand: CARDINAL HEALTH

## (undated) DEVICE — PAD,NON-ADHERENT,3X8,STERILE,LF,1/PK: Brand: MEDLINE

## (undated) DEVICE — SOLUTION IV IRRIG POUR BRL 0.9% SODIUM CHL 2F7124

## (undated) DEVICE — BLADE ES L6IN ELASTOMERIC COAT EXT DURABLE BEND UPTO 90DEG

## (undated) DEVICE — CLIP LIG L235CM RESOL 360 BX/20

## (undated) DEVICE — GLOVE SURG SZ 65 STD WHT LTX SYN POLYMER BEAD REINF ANTI RL

## (undated) DEVICE — ENDO KIT W/SYRINGE: Brand: MEDLINE INDUSTRIES, INC.

## (undated) DEVICE — ST CHARLES MAJOR ABDOMINAL PK: Brand: MEDLINE INDUSTRIES, INC.

## (undated) DEVICE — DEFENDO AIR WATER SUCTION AND BIOPSY VALVE KIT FOR  OLYMPUS: Brand: DEFENDO AIR/WATER/SUCTION AND BIOPSY VALVE

## (undated) DEVICE — SUTURE ETHBND EXCEL SZ 0 L18IN NONABSORBABLE GRN L36MM CT-1 CX21D

## (undated) DEVICE — POOLE SUCTION HANDLE: Brand: CARDINAL HEALTH

## (undated) DEVICE — BITEBLOCK 54FR W/ DENT RIM BLOX

## (undated) DEVICE — SUTURE VCRL + SZ 2-0 L27IN ABSRB WHT SH 1/2 CIR TAPERCUT VCP417H

## (undated) DEVICE — DUAL LUMEN STOMACH TUBE: Brand: SALEM SUMP

## (undated) DEVICE — ERBE NESSY® OMEGA PLATE USA (85+23)CM² , WITH CABLE 3 M: Brand: ERBE

## (undated) DEVICE — CHLORAPREP 26ML ORANGE

## (undated) DEVICE — 3M™ TEGADERM™ TRANSPARENT FILM DRESSING FRAME STYLE, 1626W, 4 IN X 4-3/4 IN (10 CM X 12 CM), 50/CT 4CT/CASE: Brand: 3M™ TEGADERM™

## (undated) DEVICE — TUBE ET DIA7.5MM ORAL NSL CUF MURPHY EYE HI LO RADPQ LN

## (undated) DEVICE — JELLY,LUBE,STERILE,FLIP TOP,TUBE,2-OZ: Brand: MEDLINE

## (undated) DEVICE — Z DISCONTINUED GLOVE SURG SZ 7.5 L12IN FNGR THK13MIL WHT ISOLEX

## (undated) DEVICE — FORCEPS BX L L240CM DIA2.4MM RAD JAW 4 HOT FOR POLYP DISP

## (undated) DEVICE — GLOVE SURG SZ 65 THK91MIL LTX FREE SYN POLYISOPRENE

## (undated) DEVICE — 1200CC GUARDIAN II: Brand: GUARDIAN

## (undated) DEVICE — PAD,ABDOMINAL,8"X7.5",ST,LF,20/BX: Brand: MEDLINE INDUSTRIES, INC.

## (undated) DEVICE — SUTURE VCRL + SZ 2-0 L18IN ABSRB VLT L26MM SH 1/2 CIR VCP775D

## (undated) DEVICE — SNARE ENDOSCP L240CM LOOP W13MM DIA2.4MM SHT THROW SM OVL

## (undated) DEVICE — Device: Brand: LEVEL 1

## (undated) DEVICE — SUTURE VCRL + SZ 0 L27IN ABSRB UD CT-1 L36MM 1/2 CIR TAPR VCP260H

## (undated) DEVICE — GLOVE ORANGE PI 7   MSG9070

## (undated) DEVICE — SOLUTION IV 1000ML LAC RINGERS PH 6.5 INJ USP VIAFLX PLAS

## (undated) DEVICE — SOLUTION IV 1000ML 0.9% SOD CHL PH 5 INJ USP VIAFLX PLAS

## (undated) DEVICE — NO USE 18 MONTHS GOWN STD LG  1153D

## (undated) DEVICE — AIRLIFE™ NASAL OXYGEN CANNULA CURVED, FLARED TIP, WITH 7 FEET (2.1 M) CRUSH RESISTANT TUBING, OVER-THE-EAR STYLE: Brand: AIRLIFE™

## (undated) DEVICE — SURGICAL PROCEDURE PACK GWN W/ BTC A

## (undated) DEVICE — FORCEPS BX L240CM JAW DIA2.2MM RAD JAW 4 HOT DISP

## (undated) DEVICE — 3M™ TEGADERM™ TRANSPARENT FILM DRESSING FRAME STYLE, 1627, 4 IN X 10 IN (10 CM X 25 CM), 20/CT 4CT/CASE: Brand: 3M™ TEGADERM™